# Patient Record
Sex: FEMALE | Race: BLACK OR AFRICAN AMERICAN | URBAN - METROPOLITAN AREA
[De-identification: names, ages, dates, MRNs, and addresses within clinical notes are randomized per-mention and may not be internally consistent; named-entity substitution may affect disease eponyms.]

---

## 2016-12-21 LAB — HBA1C MFR BLD HPLC: 7.7 %

## 2016-12-30 LAB — CREATININE, EXTERNAL: 1.74

## 2017-03-21 ENCOUNTER — OFFICE VISIT (OUTPATIENT)
Dept: ENDOCRINOLOGY | Age: 62
End: 2017-03-21

## 2017-03-21 VITALS
HEART RATE: 54 BPM | DIASTOLIC BLOOD PRESSURE: 53 MMHG | WEIGHT: 226.5 LBS | SYSTOLIC BLOOD PRESSURE: 137 MMHG | TEMPERATURE: 97.1 F | RESPIRATION RATE: 18 BRPM | BODY MASS INDEX: 36.4 KG/M2 | HEIGHT: 66 IN

## 2017-03-21 DIAGNOSIS — Z79.4 UNCONTROLLED TYPE 2 DIABETES MELLITUS WITH HYPERGLYCEMIA, WITH LONG-TERM CURRENT USE OF INSULIN (HCC): ICD-10-CM

## 2017-03-21 DIAGNOSIS — E05.90 HYPERTHYROIDISM: ICD-10-CM

## 2017-03-21 DIAGNOSIS — Z79.4 TYPE 2 DIABETES MELLITUS WITH HYPERGLYCEMIA, WITH LONG-TERM CURRENT USE OF INSULIN (HCC): ICD-10-CM

## 2017-03-21 DIAGNOSIS — E04.0 GOITER DIFFUSE: ICD-10-CM

## 2017-03-21 DIAGNOSIS — E11.65 TYPE 2 DIABETES MELLITUS WITH HYPERGLYCEMIA, WITH LONG-TERM CURRENT USE OF INSULIN (HCC): ICD-10-CM

## 2017-03-21 DIAGNOSIS — E05.90 HYPERTHYROIDISM: Primary | ICD-10-CM

## 2017-03-21 DIAGNOSIS — E11.65 UNCONTROLLED TYPE 2 DIABETES MELLITUS WITH HYPERGLYCEMIA, WITH LONG-TERM CURRENT USE OF INSULIN (HCC): ICD-10-CM

## 2017-03-21 RX ORDER — INSULIN LISPRO 100 [IU]/ML
INJECTION, SOLUTION INTRAVENOUS; SUBCUTANEOUS
Qty: 20 ML | Refills: 5 | Status: SHIPPED | OUTPATIENT
Start: 2017-03-21 | End: 2018-04-27 | Stop reason: SDUPTHER

## 2017-03-21 RX ORDER — METHIMAZOLE 10 MG/1
10 TABLET ORAL DAILY
Qty: 30 TAB | Refills: 5 | Status: SHIPPED | OUTPATIENT
Start: 2017-03-21 | End: 2017-06-23 | Stop reason: SDUPTHER

## 2017-03-21 NOTE — PROGRESS NOTES
[facility]               Luis Fernando Guzmán MD        [address] 78 [phone] Fax [phone]           Patient Information  Date:3/23/2017  Name : Nicole Gayle 62 y.o.     YOB: 1955         Referred by: Milena Baron MD         Chief Complaint   Patient presents with    Diabetes    Thyroid Problem       History of present illness    Nicole Gayle is a 62 y.o. female  here for fu  of thyroid. In July 2015, she was found to have suppressed TSH with elevated free T4. She has a history of HIV/AIDS and is on antiretroviral therapy. She is followed by an infectious disease . She has no history of leukopenia. She was seen 6 months ago and had labs at ID clinic  She did not follow up with PCP either , she had told me that DM was managed by PCP in the past     she did not bring the meter or log. I do not have data to adjust the medications. CPAP was taken from her as she was not using it        Wt Readings from Last 3 Encounters:   03/21/17 226 lb 8 oz (102.7 kg)   09/19/16 232 lb (105.2 kg)   08/22/16 234 lb (106.1 kg)          Past Medical History:   Diagnosis Date    [facility] [address]     HIV (human immunodeficiency virus infection) ([facility] [address]     Hyperlipidemia     Hypertension     Sleep apnea        Current Outpatient Prescriptions   Medication Sig    gabapentin (NEURONTIN) 100 mg capsule Take 1 Cap by mouth two (2) times a day.  atenolol (TENORMIN) 100 mg tablet Take 1 Tab by mouth daily. Stop Propranolol    pravastatin (PRAVACHOL) 40 mg tablet Take 40 mg by mouth nightly.  sodium bicarbonate 325 mg tablet Take 325 mg by mouth two (2) times a day.  zidovudine (RETROVIR) 300 mg tablet Take 300 mg by mouth two (2) times a day.  aspirin delayed-release 81 mg tablet Take 81 mg by mouth daily.  furosemide (LASIX) 20 mg tablet Take 40 mg by mouth daily.     amLODIPine-benazepril (LOTREL) 10-20 mg per capsule Take 1 Cap by mouth daily.  FERROUS SULFATE PO Take 325 Tabs by mouth two (2) times a day.  atazanavir (REYATAZ) 300 mg capsule Take 300 mg by mouth Daily (before breakfast).  lamiVUDine (EPIVIR) 300 mg tablet Take 300 mg by mouth daily.  ritonavir (NORVIR) 100 mg tablet Take 100 mg by mouth two (2) times daily (with meals).  methIMAzole (TAPAZOLE) 10 mg tablet Take 1 Tab by mouth daily.  insulin NPH (NOVOLIN N) 100 unit/mL injection Inject 30 units in the AM and 25 units in the PM (Stop Novolog 70/30)    insulin lispro (HUMALOG) 100 unit/mL injection Use with SSI Max units daily: 45    Insulin Syringe-Needle U-100 (ULTICARE) 0.5 mL 31 gauge x 5/16 syrg Use to inject insulin twice daily Dx Code: E11.65    glucose blood VI test strips (ASCENSIA CONTOUR) strip Test blood glucose 3 times daily Dx Code: E11.65     No current facility-administered medications for this visit. Review of Systems:  - Constitutional Symptoms: no fevers, chills,   - Gastrointestinal: no dysphagia or abdominal pain  - Musculoskeletal: + joint pains + weakness  - Integumentary: no rashes  - Neurological: no numbness, tingling, or headaches  - Psychiatric: no depression or anxiety  - Endocrine: no heat intolerance, no polyuria or polydipsia    Physical Examination:  - Blood pressure 137/53, pulse (!) 54, temperature 97.1 °F (36.2 °C), temperature source Oral, resp. rate 18, height 5' 6\" (1.676 m), weight 226 lb 8 oz (102.7 kg). Body mass index is 36.56 kg/(m^2).   - General: pleasant, no distress, good eye contact  - HEENT: no exopthalmos, no periorbital edema, no scleral/conjunctival injection, EOMI, no lid lag or stare  - Neck: supple, + thyromegaly,no  nodules, lymph nodes,   - Cardiovascular: regular,  normal S1 and S2, no murmurs  - Respiratory: clear to auscultation bilaterally  - Gastrointestinal: soft, nontender, nondistended, BS +  - LE - left ankle swollen , tender and no warmth  - Neurological: alert and oriented   - Psychiatric: normal mood and affect  - Skin - normal turgor    Data Reviewed:       Lab Results   Component Value Date/Time    TSH <0.006 09/19/2016 10:54 AM    T4, Free 1.58 09/19/2016 10:54 AM        [] Reviewed labs    Assessment/Plan:     1. Hyperthyroidism    2. Type 2 diabetes mellitus with hyperglycemia, with long-term current use of insulin (HCC)    3. Goiter diffuse      1. Graves disease /Goiter  Need labs    MMI 10 mg  WBC count is normal.    Declined  radioactive iodine therapy. 2.  HIV/AIDS:  Follow up with infectious disease . We have to follow liver function as well as CBC while on Methimazole given comorbidity. 3 HTN - she is taking propranolol once daily which will not help her  TO improve compliance changed to Atenolol     4 DM type 2 -she had told me DM was managed by PCP but told Dr Glory Saenz that I am managing DM   Will forward the note  NPH 30 units in AM and 25 units         Follow-up Disposition:  Return in about 3 months (around 6/21/2017). Thank you for allowing me to participate in the care of this patient.     Meet Bell MD

## 2017-03-21 NOTE — PATIENT INSTRUCTIONS
NPH 30 units in AM and 25 units at bedtime    Humalog fast acting for sugars as per the scale below     Blood sugar  Breakfast/Lunch/Dinner         150-200  Add  3  Units       201-250  Add  6 Units       251-300  Add  9 Units       301-350  Add 12  Units        351-400  Add 15  Units

## 2017-03-21 NOTE — PROGRESS NOTES
Patel Shankar is a 62 y.o. female here for   Chief Complaint   Patient presents with    Diabetes    Thyroid Problem       Functional glucose monitor and record keeping system? - yes  Eye exam within last year? - has appt scheduled  Foot exam within last year? - yes PCP yesterday    Lab Results   Component Value Date/Time    Hemoglobin A1c 6.2 09/19/2016 10:54 AM    Hemoglobin A1c, External 8.7 07/29/2015 03:14 PM       Wt Readings from Last 3 Encounters:   09/19/16 232 lb (105.2 kg)   08/22/16 234 lb (106.1 kg)   03/17/16 225 lb (102.1 kg)     Temp Readings from Last 3 Encounters:   09/19/16 97 °F (36.1 °C)   08/22/16 98.2 °F (36.8 °C) (Oral)   03/17/16 96 °F (35.6 °C) (Oral)     BP Readings from Last 3 Encounters:   09/19/16 153/66   08/22/16 135/63   03/17/16 181/73     Pulse Readings from Last 3 Encounters:   09/19/16 67   08/22/16 66   03/17/16 93

## 2017-03-21 NOTE — MR AVS SNAPSHOT
Visit Information Date & Time Provider Department Dept. Phone Encounter #  
 3/21/2017  1:15 PM Magali Vo MD [facility] [phone] [id number] Follow-up Instructions Return in about 3 months (around 6/21/2017). Upcoming Health Maintenance Date Due Hepatitis C Screening 1955 FOOT EXAM Q1 8/20/1966 EYE EXAM RETINAL OR DILATED Q1 8/20/1966 Pneumococcal 19-64 Highest Risk (1 of 3 - PCV13) 8/20/1975 DTaP/Tdap/Td series (1 - Tdap) 8/20/1977 PAP AKA CERVICAL CYTOLOGY 8/20/1977 BREAST CANCER SCRN MAMMOGRAM 8/20/2006 FOBT Q 1 YEAR AGE 50-75 8/20/2006 MICROALBUMIN Q1 7/29/2016 INFLUENZA AGE 9 TO ADULT 8/1/2016 ZOSTER VACCINE AGE 60> 8/20/2016 HEMOGLOBIN A1C Q6M 3/19/2017 LIPID PANEL Q1 9/19/2017 Allergies as of 3/21/2017  Review Complete On: 3/21/2017 By: Magali Vo MD  
  
 Severity Noted Reaction Type Reactions Pcn [Penicillins]  03/17/2016    Unknown (comments) Happened as a child Current Immunizations  Never Reviewed No immunizations on file. Not reviewed this visit You Were Diagnosed With   
  
 Codes Comments Type 2 diabetes mellitus with hyperglycemia, with long-term current use of insulin (HCC)    -  Primary ICD-10-CM: E11.65, Z79.4 ICD-9-CM: 250.00, 790.29, V58.67 Goiter diffuse     ICD-10-CM: E04.9 ICD-9-CM: 240.9 Hyperthyroidism     ICD-10-CM: E05.90 ICD-9-CM: 242.90 Vitals BP Pulse Temp Resp Height(growth percentile) Weight(growth percentile) 137/53 (BP 1 Location: Left arm, BP Patient Position: Sitting) (!) 54 97.1 °F (36.2 °C) (Oral) 18 5' 6\" (1.676 m) 226 lb 8 oz (102.7 kg) BMI OB Status Smoking Status 36.56 kg/m2 Unknown Current Every Day Smoker BMI and BSA Data Body Mass Index Body Surface Area  
 36.56 kg/m 2 2.19 m 2 Preferred Pharmacy Pharmacy Name Phone  [address] - [address] [phone] Your Updated Medication List  
  
   
This list is accurate as of: 3/21/17  1:51 PM.  Always use your most recent med list. amLODIPine-benazepril 10-20 mg per capsule Commonly known as:  Samantha Hausen Take 1 Cap by mouth daily. aspirin delayed-release 81 mg tablet Take 81 mg by mouth daily. atenolol 100 mg tablet Commonly known as:  TENORMIN Take 1 Tab by mouth daily. Stop Propranolol FERROUS SULFATE PO Take 325 Tabs by mouth two (2) times a day. furosemide 20 mg tablet Commonly known as:  LASIX Take 40 mg by mouth daily. gabapentin 100 mg capsule Commonly known as:  NEURONTIN Take 1 Cap by mouth two (2) times a day. glucose blood VI test strips strip Commonly known as:  Ascensia CONTOUR Test blood glucose 3 times daily Dx Code: E11.65  
  
 insulin  unit/mL injection Commonly known as:  NovoLIN N Inject 30 units in the AM and 25 units in the PM (Stop Novolog 70/30) Insulin Syringe-Needle U-100 0.5 mL 31 gauge x 5/16 Syrg Commonly known as:  Mariah Luu Use to inject insulin twice daily Dx Code: E11.65  
  
 lamiVUDine 300 mg tablet Commonly known as:  Stuckey Crews Take 300 mg by mouth daily. methIMAzole 10 mg tablet Commonly known as:  TAPAZOLE Take 1 Tab by mouth daily. NORVIR 100 mg tablet Generic drug:  ritonavir Take 100 mg by mouth two (2) times daily (with meals). pravastatin 40 mg tablet Commonly known as:  PRAVACHOL Take 40 mg by mouth nightly. REYATAZ 300 mg capsule Generic drug:  atazanavir Take 300 mg by mouth Daily (before breakfast). sodium bicarbonate 325 mg tablet Take 325 mg by mouth two (2) times a day. zidovudine 300 mg tablet Commonly known as:  RETROVIR Take 300 mg by mouth two (2) times a day. Follow-up Instructions Return in about 3 months (around 6/21/2017). To-Do List   
 03/21/2017   Lab: HEMOGLOBIN A1C WITH EAG   
  
 03/21/2017 Lab:  LDL, DIRECT   
  
 03/21/2017 Lab:  METABOLIC PANEL, COMPREHENSIVE   
  
 03/21/2017 Lab:  MICROALBUMIN, UR, RAND W/ MICROALBUMIN/CREA RATIO   
  
 03/21/2017 Lab:  T4, FREE   
  
 03/21/2017 Lab:  TSH 3RD GENERATION   
  
 06/01/2017 Lab:  HEMOGLOBIN A1C WITH EAG   
  
 06/01/2017 Lab:  LDL, DIRECT   
  
 06/01/2017 Lab:  METABOLIC PANEL, COMPREHENSIVE   
  
 06/01/2017 Lab:  MICROALBUMIN, UR, RAND W/ MICROALBUMIN/CREA RATIO   
  
 06/01/2017 Lab:  T4, FREE   
  
 06/01/2017 Lab:  TSH 3RD GENERATION Patient Instructions NPH 30 units in AM and 25 units at bedtime Humalog fast acting for sugars as per the scale below Blood sugar  Breakfast/Lunch/Dinner 150-200  Add  3  Units 201-250  Add  6 Units 251-300  Add  9 Units 301-350  Add 12  Units 351-400  Add 15  Units Introducing [facility]! Virgil Ramirez introduces [employer] patient portal. Now you can access parts of your medical record, email your doctor's office, and request medication refills online. 1. In your internet browser, go to [email or web address]. [email or web address] 2. Click on the First Time User? Click Here link in the Sign In box. You will see the New Member Sign Up page. 3. Enter your [employer] Access Code exactly as it appears below. You will not need to use this code after youve completed the sign-up process. If you do not sign up before the expiration date, you must request a new code. · [employer] Access Code: [email or web address] Expires: 6/19/2017  1:51 PM 
 
4. Enter the last four digits of your Social Security Number (xxxx) and Date of Birth (mm/dd/yyyy) as indicated and click Submit. You will be taken to the next sign-up page. 5. Create a [employer] ID. This will be your [employer] login ID and cannot be changed, so think of one that is secure and easy to remember. 6. Create a [employer] password. You can change your password at any time. 7. Enter your Password Reset Question and Answer. This can be used at a later time if you forget your password. 8. Enter your e-mail address. You will receive e-mail notification when new information is available in [address] 9. Click Sign Up. You can now view and download portions of your medical record. 10. Click the Download Summary menu link to download a portable copy of your medical information. If you have questions, please visit the Frequently Asked Questions section of the [employer] website. Remember, [employer] is NOT to be used for urgent needs. For medical emergencies, dial 911. Now available from your iPhone and Android! Please provide this summary of care documentation to your next provider. Your primary care clinician is listed as Mckinley Weeks If you have any questions after today's visit, please call [phone].

## 2017-04-08 LAB
ALBUMIN SERPL-MCNC: 4.1 G/DL (ref 3.6–4.8)
ALBUMIN/CREAT UR: 1211.5 MG/G CREAT (ref 0–30)
ALBUMIN/GLOB SERPL: 1.3 {RATIO} (ref 1.2–2.2)
ALP SERPL-CCNC: 204 IU/L (ref 39–117)
ALT SERPL-CCNC: 52 IU/L (ref 0–32)
AST SERPL-CCNC: 39 IU/L (ref 0–40)
BILIRUB SERPL-MCNC: 4.8 MG/DL (ref 0–1.2)
BUN SERPL-MCNC: 31 MG/DL (ref 8–27)
BUN/CREAT SERPL: 25 (ref 12–28)
CALCIUM SERPL-MCNC: 9.1 MG/DL (ref 8.7–10.3)
CHLORIDE SERPL-SCNC: 106 MMOL/L (ref 96–106)
CO2 SERPL-SCNC: 22 MMOL/L (ref 18–29)
CREAT SERPL-MCNC: 1.26 MG/DL (ref 0.57–1)
CREAT UR-MCNC: 48.7 MG/DL
EST. AVERAGE GLUCOSE BLD GHB EST-MCNC: 163 MG/DL
GLOBULIN SER CALC-MCNC: 3.1 G/DL (ref 1.5–4.5)
GLUCOSE SERPL-MCNC: 167 MG/DL (ref 65–99)
HBA1C MFR BLD: 7.3 % (ref 4.8–5.6)
INTERPRETATION: NORMAL
LDLC SERPL DIRECT ASSAY-MCNC: 84 MG/DL (ref 0–99)
MICROALBUMIN UR-MCNC: 590 UG/ML
POTASSIUM SERPL-SCNC: 4.8 MMOL/L (ref 3.5–5.2)
PROT SERPL-MCNC: 7.2 G/DL (ref 6–8.5)
SODIUM SERPL-SCNC: 145 MMOL/L (ref 134–144)
T4 FREE SERPL-MCNC: 1.84 NG/DL (ref 0.82–1.77)
TSH SERPL DL<=0.005 MIU/L-ACNC: <0.006 UIU/ML (ref 0.45–4.5)

## 2017-04-10 ENCOUNTER — TELEPHONE (OUTPATIENT)
Dept: ENDOCRINOLOGY | Age: 62
End: 2017-04-10

## 2017-04-11 LAB
BASOPHILS # BLD AUTO: 0 X10E3/UL (ref 0–0.2)
BASOPHILS NFR BLD AUTO: 0 %
EOSINOPHIL # BLD AUTO: 0.2 X10E3/UL (ref 0–0.4)
EOSINOPHIL NFR BLD AUTO: 3 %
ERYTHROCYTE [DISTWIDTH] IN BLOOD BY AUTOMATED COUNT: 16.5 % (ref 12.3–15.4)
HCT VFR BLD AUTO: 33.4 % (ref 34–46.6)
HGB BLD-MCNC: 10.3 G/DL (ref 11.1–15.9)
IMM GRANULOCYTES # BLD: 0 X10E3/UL (ref 0–0.1)
IMM GRANULOCYTES NFR BLD: 0 %
LYMPHOCYTES # BLD AUTO: 1.8 X10E3/UL (ref 0.7–3.1)
LYMPHOCYTES NFR BLD AUTO: 26 %
MCH RBC QN AUTO: 27.8 PG (ref 26.6–33)
MCHC RBC AUTO-ENTMCNC: 30.8 G/DL (ref 31.5–35.7)
MCV RBC AUTO: 90 FL (ref 79–97)
MONOCYTES # BLD AUTO: 0.6 X10E3/UL (ref 0.1–0.9)
MONOCYTES NFR BLD AUTO: 9 %
NEUTROPHILS # BLD AUTO: 4.3 X10E3/UL (ref 1.4–7)
NEUTROPHILS NFR BLD AUTO: 62 %
PLATELET # BLD AUTO: 295 X10E3/UL (ref 150–379)
RBC # BLD AUTO: 3.7 X10E6/UL (ref 3.77–5.28)
SPECIMEN STATUS REPORT, ROLRST: NORMAL
WBC # BLD AUTO: 6.9 X10E3/UL (ref 3.4–10.8)

## 2017-06-23 ENCOUNTER — OFFICE VISIT (OUTPATIENT)
Dept: ENDOCRINOLOGY | Age: 62
End: 2017-06-23

## 2017-06-23 VITALS
OXYGEN SATURATION: 97 % | HEART RATE: 58 BPM | BODY MASS INDEX: 36.48 KG/M2 | RESPIRATION RATE: 20 BRPM | HEIGHT: 66 IN | TEMPERATURE: 97.4 F | SYSTOLIC BLOOD PRESSURE: 122 MMHG | WEIGHT: 227 LBS | DIASTOLIC BLOOD PRESSURE: 48 MMHG

## 2017-06-23 DIAGNOSIS — E11.65 UNCONTROLLED TYPE 2 DIABETES MELLITUS WITH HYPERGLYCEMIA, UNSPECIFIED LONG TERM INSULIN USE STATUS: ICD-10-CM

## 2017-06-23 DIAGNOSIS — E05.90 HYPERTHYROIDISM: ICD-10-CM

## 2017-06-23 DIAGNOSIS — E04.0 GOITER DIFFUSE: ICD-10-CM

## 2017-06-23 DIAGNOSIS — Z79.4 UNCONTROLLED TYPE 2 DIABETES MELLITUS WITH HYPERGLYCEMIA, WITH LONG-TERM CURRENT USE OF INSULIN (HCC): ICD-10-CM

## 2017-06-23 DIAGNOSIS — E11.65 UNCONTROLLED TYPE 2 DIABETES MELLITUS WITH HYPERGLYCEMIA, WITH LONG-TERM CURRENT USE OF INSULIN (HCC): ICD-10-CM

## 2017-06-23 DIAGNOSIS — E05.90 HYPERTHYROIDISM: Primary | ICD-10-CM

## 2017-06-23 RX ORDER — METHIMAZOLE 10 MG/1
TABLET ORAL
Qty: 30 TAB | Refills: 5 | Status: SHIPPED | OUTPATIENT
Start: 2017-06-23 | End: 2017-12-29 | Stop reason: SDUPTHER

## 2017-06-23 RX ORDER — GABAPENTIN 600 MG/1
TABLET ORAL 3 TIMES DAILY
COMMUNITY
End: 2018-11-26

## 2017-06-23 RX ORDER — TRAZODONE HYDROCHLORIDE 50 MG/1
TABLET ORAL
COMMUNITY
End: 2018-11-26

## 2017-06-23 NOTE — PROGRESS NOTES
Chief Complaint   Patient presents with    Diabetes     f/u     Eye exam on June 29th    Foot exam was this year, unknown month    Wt Readings from Last 3 Encounters:   06/23/17 227 lb (103 kg)   03/21/17 226 lb 8 oz (102.7 kg)   09/19/16 232 lb (105.2 kg)     Temp Readings from Last 3 Encounters:   06/23/17 97.4 °F (36.3 °C) (Oral)   03/21/17 97.1 °F (36.2 °C) (Oral)   09/19/16 97 °F (36.1 °C)     BP Readings from Last 3 Encounters:   06/23/17 120/46   03/21/17 137/53   09/19/16 153/66     Pulse Readings from Last 3 Encounters:   06/23/17 (!) 58   03/21/17 (!) 54   09/19/16 67

## 2017-06-23 NOTE — PATIENT INSTRUCTIONS
NPH 30 units in AM and 20 units at bedtime    Humalog fast acting for sugars as per the scale below     Blood sugar  Breakfast/Lunch/Dinner         150-200  Add  3  Units       201-250  Add  6 Units       251-300  Add  9 Units       301-350  Add 12  Units        351-400  Add 15  Units            Radioactive Iodine: What to Expect at Home  Your Recovery  Radioactive iodine is absorbed and concentrated by the thyroid gland. You get it in liquid or pill form. The radiation will pass out of your body through your urine within days. Until that time, you will give off radiation in your sweat, your saliva, your urine, and anything else that comes out of your body. It is important to avoid exposing other people to the radioactivity from your body. Your doctor will give you more written instructions. Follow these carefully. The instructions will tell you how far to stay away from people and how long you need to follow the precautions listed below. They will list other ways to keep other people safe. They will also tell you when it will be safe to go out, go to work, and do other activities. How can you care for yourself at home? General recommendations  · For a period of time, you will need to keep your distance from other people, especially young children and pregnant women. · Avoid close contact, kissing, and sexual activity. You may need to sleep in a separate bed from your partner. · Keep the toilet very clean. Men should urinate sitting down to avoid splashing. Flush the toilet 2 or 3 times after each use. Wash your hands well with soap and lots of water each time you use the toilet. · Rinse the bathroom sink and tub well after you use them. · Use separate towels, washcloths, and sheets. Wash these and your personal clothing by themselves. Don't wash them with other people's laundry.   · You may want to use a special plastic trash bag for all your trash, such as bandages, paper or plastic dishes, menstrual pads, tissues, or paper towels. Talk to your treatment facility to see if they will handle the disposal. Or after 80 days, this bag can be thrown out with your other trash. · Wash your dishes in a  or by hand. If you use disposable dishes, they must be thrown away in the special plastic trash bag. · Don't cook for other people. If you must cook, use plastic gloves. Then throw them away in the special plastic trash bag. Don't share cups, dishes, or utensils. Pregnancy and children  · Your doctor will tell you when it is safe to have sex and become pregnant. · You should not breastfeed your baby after you have been treated with radioactive iodine. Ask your doctor when it's safe to breastfeed. Travel  · Don't take public transportation. If you are able, it's best to drive yourself. · It is important to prepare for any problems you may have at airport security. People who have had radioactive iodine treatment can set off the radiation detection machines in airports for a week to 10 days. Check with local authorities about any steps or permission you may need to travel. · If you plan to travel on the interstate, you may set off radiation detectors. Most police and transportation workers are aware of medical radiation, but it may help to carry some paperwork from your doctor. Follow-up care is a key part of your treatment and safety. Be sure to make and go to all appointments, and call your doctor if you are having problems. It's also a good idea to know your test results and keep a list of the medicines you take. When should you call for help? Watch closely for any changes in your health, and be sure to contact your doctor if:  · You have a sore throat. · You vomit. · You have diarrhea. Where can you learn more? Go to http://bridget-agustín.info/. Enter A096 in the search box to learn more about \"Radioactive Iodine: What to Expect at Home. \"  Current as of: July 28, 2016  Content Version: 11.3  © 8774-5690 [employer]. Care instructions adapted under license by [employer] (which disclaims liability or warranty for this information). If you have questions about a medical condition or this instruction, always ask your healthcare professional. [address] any warranty or liability for your use of this information.

## 2017-06-23 NOTE — MR AVS SNAPSHOT
Visit Information Date & Time Provider Department Dept. Phone Encounter #  
 6/23/2017  2:30 PM Alexander Lewis MD [facility] [phone] [id number] Follow-up Instructions Return in about 4 months (around 10/23/2017). Upcoming Health Maintenance Date Due Hepatitis C Screening 1955 FOOT EXAM Q1 8/20/1966 EYE EXAM RETINAL OR DILATED Q1 8/20/1966 Pneumococcal 19-64 Highest Risk (1 of 3 - PCV13) 8/20/1975 DTaP/Tdap/Td series (1 - Tdap) 8/20/1977 PAP AKA CERVICAL CYTOLOGY 8/20/1977 BREAST CANCER SCRN MAMMOGRAM 8/20/2006 FOBT Q 1 YEAR AGE 50-75 8/20/2006 ZOSTER VACCINE AGE 60> 8/20/2016 INFLUENZA AGE 9 TO ADULT 8/1/2017 HEMOGLOBIN A1C Q6M 12/16/2017 LIPID PANEL Q1 3/16/2018 MICROALBUMIN Q1 6/16/2018 Allergies as of 6/23/2017  Review Complete On: 6/23/2017 By: Alexander Lewis MD  
  
 Severity Noted Reaction Type Reactions Pcn [Penicillins]  03/17/2016    Unknown (comments) Happened as a child Current Immunizations  Never Reviewed No immunizations on file. Not reviewed this visit You Were Diagnosed With   
  
 Codes Comments Goiter diffuse    -  Primary ICD-10-CM: E04.9 ICD-9-CM: 240.9 Hyperthyroidism     ICD-10-CM: E05.90 ICD-9-CM: 242.90 Vitals BP Pulse Temp Resp Height(growth percentile) Weight(growth percentile) 122/48 (BP 1 Location: Left arm, BP Patient Position: Sitting) (!) 58 97.4 °F (36.3 °C) (Oral) 20 5' 6\" (1.676 m) 227 lb (103 kg) SpO2 BMI OB Status Smoking Status 97% 36.64 kg/m2 Unknown Current Every Day Smoker Vitals History BMI and BSA Data Body Mass Index Body Surface Area  
 36.64 kg/m 2 2.19 m 2 Preferred Pharmacy Pharmacy Name Phone [address], [address] [place] [address] [address] [phone] Your Updated Medication List  
  
   
This list is accurate as of: 6/23/17  2:33 PM.  Always use your most recent med list. amLODIPine-benazepril 10-20 mg per capsule Commonly known as:  Edythe Lien Take 1 Cap by mouth daily. aspirin delayed-release 81 mg tablet Take 81 mg by mouth daily. atenolol 100 mg tablet Commonly known as:  TENORMIN Take 1 Tab by mouth daily. Stop Propranolol FERROUS SULFATE PO Take 325 Tabs by mouth two (2) times a day. furosemide 20 mg tablet Commonly known as:  LASIX Take 40 mg by mouth daily. * gabapentin 600 mg tablet Commonly known as:  NEURONTIN Take  by mouth three (3) times daily. * gabapentin 100 mg capsule Commonly known as:  NEURONTIN Take 1 Cap by mouth two (2) times a day. glucose blood VI test strips strip Commonly known as:  Ascensia CONTOUR Test blood glucose 3 times daily Dx Code: E11.65  
  
 insulin lispro 100 unit/mL injection Commonly known as:  HUMALOG Use with SSI Max units daily: 45 Insulin Syringe-Needle U-100 0.5 mL 31 gauge x 5/16 Syrg Commonly known as:  Areatha Starling Use to inject insulin twice daily Dx Code: E11.65  
  
 lamiVUDine 300 mg tablet Commonly known as:  Gemma Marking Take 300 mg by mouth daily. methIMAzole 10 mg tablet Commonly known as:  TAPAZOLE Take 1 Tab by mouth daily. NORVIR 100 mg tablet Generic drug:  ritonavir Take 100 mg by mouth two (2) times daily (with meals). NovoLIN N 100 unit/mL injection Generic drug:  insulin NPH INJECT 30 UNITS EVERY MORNING & 25 UNITS EVERY EVENING. (STOP ICFDEPL88/30) pravastatin 40 mg tablet Commonly known as:  PRAVACHOL Take 40 mg by mouth nightly. REYATAZ 300 mg capsule Generic drug:  atazanavir Take 300 mg by mouth Daily (before breakfast). sodium bicarbonate 325 mg tablet Take 325 mg by mouth two (2) times a day. traZODone 50 mg tablet Commonly known as:  Marguerite Villalpando Take  by mouth nightly. zidovudine 300 mg tablet Commonly known as:  RETROVIR Take 300 mg by mouth two (2) times a day. * Notice: This list has 2 medication(s) that are the same as other medications prescribed for you. Read the directions carefully, and ask your doctor or other care provider to review them with you. Follow-up Instructions Return in about 4 months (around 10/23/2017). Patient Instructions NPH 30 units in AM and 20 units at bedtime Humalog fast acting for sugars as per the scale below Blood sugar  Breakfast/Lunch/Dinner 150-200  Add  3  Units 201-250  Add  6 Units 251-300  Add  9 Units 301-350  Add 12  Units 351-400  Add 15  Units Radioactive Iodine: What to Expect at [facility] Your Recovery Radioactive iodine is absorbed and concentrated by the thyroid gland. You get it in liquid or pill form. The radiation will pass out of your body through your urine within days. Until that time, you will give off radiation in your sweat, your saliva, your urine, and anything else that comes out of your body. It is important to avoid exposing other people to the radioactivity from your body. Your doctor will give you more written instructions. Follow these carefully. The instructions will tell you how far to stay away from people and how long you need to follow the precautions listed below. They will list other ways to keep other people safe. They will also tell you when it will be safe to go out, go to work, and do other activities. How can you care for yourself at home? General recommendations · For a period of time, you will need to keep your distance from other people, especially young children and pregnant women. · Avoid close contact, kissing, and sexual activity. You may need to sleep in a separate bed from your partner. · Keep the toilet very clean. Men should urinate sitting down to avoid splashing. Flush the toilet 2 or 3 times after each use.  Wash your hands well with soap and lots of water each time you use the toilet. · Rinse the bathroom sink and tub well after you use them. · Use separate towels, washcloths, and sheets. Wash these and your personal clothing by themselves. Don't wash them with other people's laundry. · You may want to use a special plastic trash bag for all your trash, such as bandages, paper or plastic dishes, menstrual pads, tissues, or paper towels. Talk to your treatment facility to see if they will handle the disposal. Or after 80 days, this bag can be thrown out with your other trash. · Wash your dishes in a  or by hand. If you use disposable dishes, they must be thrown away in the special plastic trash bag. · Don't cook for other people. If you must cook, use plastic gloves. Then throw them away in the special plastic trash bag. Don't share cups, dishes, or utensils. Pregnancy and children · Your doctor will tell you when it is safe to have sex and become pregnant. · You should not breastfeed your baby after you have been treated with radioactive iodine. Ask your doctor when it's safe to breastfeed. Travel · Don't take public transportation. If you are able, it's best to drive yourself. · It is important to prepare for any problems you may have at airport security. People who have had radioactive iodine treatment can set off the radiation detection machines in airports for a week to 10 days. Check with local authorities about any steps or permission you may need to travel. · If you plan to travel on the interstate, you may set off radiation detectors. Most police and transportation workers are aware of medical radiation, but it may help to carry some paperwork from your doctor. Follow-up care is a key part of your treatment and safety. Be sure to make and go to all appointments, and call your doctor if you are having problems. It's also a good idea to know your test results and keep a list of the medicines you take. When should you call for help? Watch closely for any changes in your health, and be sure to contact your doctor if: 
· You have a sore throat. · You vomit. · You have diarrhea. Where can you learn more? Go to http://bridget-agustín.info/. Enter P099 in the search box to learn more about \"Radioactive Iodine: What to Expect at Home. \" Current as of: July 28, 2016 Content Version: 11.3 © 1041-2267 [employer]. Care instructions adapted under license by [employer] (which disclaims liability or warranty for this information). If you have questions about a medical condition or this instruction, always ask your healthcare professional. [address] any warranty or liability for your use of this information. Introducing [facility]! [employer] introduces Truli patient portal. Now you can access parts of your medical record, email your doctor's office, and request medication refills online. 1. In your internet browser, go to [email or web address] 2. Click on the First Time User? Click Here link in the Sign In box. You will see the New Member Sign Up page. 3. Enter your Truli Access Code exactly as it appears below. You will not need to use this code after youve completed the sign-up process. If you do not sign up before the expiration date, you must request a new code. · Truli Access Code: [email or web address] Expires: 9/21/2017  2:33 PM 
 
4. Enter the last four digits of your Social Security Number (xxxx) and Date of Birth (mm/dd/yyyy) as indicated and click Submit. You will be taken to the next sign-up page. 5. Create a ParinGeni[employer] ID. This will be your Truli login ID and cannot be changed, so think of one that is secure and easy to remember. 6. Create a [employer] password. You can change your password at any time. 7. Enter your Password Reset Question and Answer. This can be used at a later time if you forget your password.   
8. Enter your e-mail address. You will receive e-mail notification when new information is available in [address] 9. Click Sign Up. You can now view and download portions of your medical record. 10. Click the Download Summary menu link to download a portable copy of your medical information. If you have questions, please visit the Frequently Asked Questions section of the [employer] website. Remember, [employer] is NOT to be used for urgent needs. For medical emergencies, dial 911. Now available from your iPhone and Android! Please provide this summary of care documentation to your next provider. Your primary care clinician is listed as Jessica López. If you have any questions after today's visit, please call [phone].

## 2017-06-23 NOTE — PROGRESS NOTES
[facility]               Amirah Valdovinos MD        [address] 78 [phone] Fax [phone]           Patient Information  Date:6/23/2017  Name : Arun Hughes 62 y.o.     YOB: 1955         Referred by: Ernesto Sumner MD         Chief Complaint   Patient presents with    Diabetes     f/u       History of present illness    Arun Hughes is a 62 y.o. female  here for fu  of thyroid. In July 2015, she was found to have suppressed TSH with elevated free T4. She has a history of HIV/AIDS and is on antiretroviral therapy. She is followed by an infectious disease . She has no history of leukopenia. She was seen 6 months ago and had labs at ID clinic  She did not follow up with PCP either , she had told me that DM was managed by PCP in the past     she did not bring the meter or log. I do not have data to adjust the medications. CPAP was taken from her as she was not using it        Wt Readings from Last 3 Encounters:   06/23/17 227 lb (103 kg)   03/21/17 226 lb 8 oz (102.7 kg)   09/19/16 232 lb (105.2 kg)          Past Medical History:   Diagnosis Date    Diabetes [facility] [address]     HIV [facility]human immunodeficiency virus infection) [facility] [address] [facility]     Hyperlipidemia     Hypertension     Sleep apnea        Current Outpatient Prescriptions   Medication Sig    gabapentin (NEURONTIN) 600 mg tablet Take  by mouth three (3) times daily.  traZODone (DESYREL) 50 mg tablet Take  by mouth nightly.  NOVOLIN N 100 unit/mL injection INJECT 30 UNITS EVERY MORNING & 25 UNITS EVERY EVENING. (STOP WMMVIPK91/30)    insulin lispro (HUMALOG) 100 unit/mL injection Use with SSI Max units daily: 45    Insulin Syringe-Needle U-100 (ULTICARE) 0.5 mL 31 gauge x 5/16 syrg Use to inject insulin twice daily Dx Code: E11.65    atenolol (TENORMIN) 100 mg tablet Take 1 Tab by mouth daily.  Stop Propranolol    glucose blood VI test strips (ASCENSIA CONTOUR) strip Test blood glucose 3 times daily Dx Code: E11.65    pravastatin (PRAVACHOL) 40 mg tablet Take 40 mg by mouth nightly.  sodium bicarbonate 325 mg tablet Take 325 mg by mouth two (2) times a day.  zidovudine (RETROVIR) 300 mg tablet Take 300 mg by mouth two (2) times a day.  aspirin delayed-release 81 mg tablet Take 81 mg by mouth daily.  furosemide (LASIX) 20 mg tablet Take 40 mg by mouth daily.  amLODIPine-benazepril (LOTREL) 10-20 mg per capsule Take 1 Cap by mouth daily.  FERROUS SULFATE PO Take 325 Tabs by mouth two (2) times a day.  atazanavir (REYATAZ) 300 mg capsule Take 300 mg by mouth Daily (before breakfast).  lamiVUDine (EPIVIR) 300 mg tablet Take 300 mg by mouth daily.  ritonavir (NORVIR) 100 mg tablet Take 100 mg by mouth two (2) times daily (with meals).  methIMAzole (TAPAZOLE) 10 mg tablet Take 10 mg alternating with 5 mg    gabapentin (NEURONTIN) 100 mg capsule Take 1 Cap by mouth two (2) times a day. No current facility-administered medications for this visit. Review of Systems:  - Constitutional Symptoms: no fevers, chills,   - Gastrointestinal: no dysphagia or abdominal pain  - Musculoskeletal: + joint pains + weakness  - Integumentary: no rashes  - Neurological: no numbness, tingling, or headaches  - Psychiatric: no depression or anxiety  - Endocrine: no heat intolerance, no polyuria or polydipsia    Physical Examination:  - Blood pressure 122/48, pulse (!) 58, temperature 97.4 °F (36.3 °C), temperature source Oral, resp. rate 20, height 5' 6\" (1.676 m), weight 227 lb (103 kg), SpO2 97 %. Body mass index is 36.64 kg/(m^2).   - General: pleasant, no distress, good eye contact  - HEENT: no exopthalmos, no periorbital edema, no scleral/conjunctival injection, EOMI, no lid lag or stare  - Neck: supple, + thyromegaly,no  nodules, lymph nodes,   - Cardiovascular: regular,  normal S1 and S2, no murmurs  - Respiratory: clear to auscultation bilaterally  - Gastrointestinal: soft, nontender, nondistended, BS +  - LE - left ankle swollen , tender and no warmth  - Neurological: alert and oriented   - Psychiatric: normal mood and affect  - Skin - normal turgor    Data Reviewed:       Lab Results   Component Value Date/Time    TSH 0.006 06/16/2017 10:42 AM    T4, Free 1.28 06/16/2017 10:42 AM        [] Reviewed labs    Assessment/Plan:     1. Hyperthyroidism    2. Goiter diffuse    3. Uncontrolled type 2 diabetes mellitus with hyperglycemia, unspecified long term insulin use status      1. Graves disease /Goiter     MMI 10 mg alternate with 5 mg  WBC count is normal.    Declined  radioactive iodine therapy. 2.  HIV/AIDS:  Follow up with infectious disease . We have to follow liver function as well as CBC while on Methimazole given comorbidity. 3 HTN - decrease Atenolol     4 DM type 2 -  Lab Results   Component Value Date/Time    Hemoglobin A1c 6.3 06/16/2017 10:42 AM    Hemoglobin A1c, External 7.7 12/21/2016     NPH 30 units in AM and 20 units         Follow-up Disposition:  Return in about 4 months (around 10/23/2017). Thank you for allowing me to participate in the care of this patient.     León Meza MD

## 2017-10-23 ENCOUNTER — OFFICE VISIT (OUTPATIENT)
Dept: ENDOCRINOLOGY | Age: 62
End: 2017-10-23

## 2017-10-23 VITALS
HEART RATE: 75 BPM | DIASTOLIC BLOOD PRESSURE: 58 MMHG | SYSTOLIC BLOOD PRESSURE: 134 MMHG | OXYGEN SATURATION: 96 % | WEIGHT: 220.8 LBS | RESPIRATION RATE: 16 BRPM | BODY MASS INDEX: 35.48 KG/M2 | TEMPERATURE: 97.6 F | HEIGHT: 66 IN

## 2017-10-23 DIAGNOSIS — Z79.4 TYPE 2 DIABETES MELLITUS WITH HYPERGLYCEMIA, WITH LONG-TERM CURRENT USE OF INSULIN (HCC): ICD-10-CM

## 2017-10-23 DIAGNOSIS — E05.90 HYPERTHYROIDISM: ICD-10-CM

## 2017-10-23 DIAGNOSIS — E04.0 GOITER DIFFUSE: ICD-10-CM

## 2017-10-23 DIAGNOSIS — E11.65 TYPE 2 DIABETES MELLITUS WITH HYPERGLYCEMIA, WITH LONG-TERM CURRENT USE OF INSULIN (HCC): ICD-10-CM

## 2017-10-23 DIAGNOSIS — E05.00 GRAVES DISEASE: Primary | ICD-10-CM

## 2017-10-23 RX ORDER — ERGOCALCIFEROL 1.25 MG/1
50000 CAPSULE ORAL
COMMUNITY

## 2017-10-23 RX ORDER — METOPROLOL SUCCINATE 25 MG/1
TABLET, EXTENDED RELEASE ORAL DAILY
COMMUNITY

## 2017-10-23 RX ORDER — HYDRALAZINE HYDROCHLORIDE 25 MG/1
25 TABLET, FILM COATED ORAL 3 TIMES DAILY
COMMUNITY

## 2017-10-23 NOTE — PROGRESS NOTES
Shwetha Batres is a 65 y.o. female here for   Chief Complaint   Patient presents with    Diabetes    Thyroid Problem       Functional glucose monitor and record keeping system? - yes  Eye exam within last year? - June 2017  Foot exam within last year? - beginning of 2017    1. Have you been to the ER, urgent care clinic since your last visit? Hospitalized since your last visit? -[address] [place] [address] for heart failure on 9/23/17    2. Have you seen or consulted any other health care providers outside of the [address] [place] [address] since your last visit? Include any pap smears or colon screening. -PCP      Lab Results   Component Value Date/Time    Hemoglobin A1c 7.1 10/16/2017 09:42 AM    Hemoglobin A1c, External 7.7 12/21/2016       Wt Readings from Last 3 Encounters:   06/23/17 227 lb (103 kg)   03/21/17 226 lb 8 oz (102.7 kg)   09/19/16 232 lb (105.2 kg)     Temp Readings from Last 3 Encounters:   06/23/17 97.4 °F (36.3 °C) (Oral)   03/21/17 97.1 °F (36.2 °C) (Oral)   09/19/16 97 °F (36.1 °C)     BP Readings from Last 3 Encounters:   06/23/17 122/48   03/21/17 137/53   09/19/16 153/66     Pulse Readings from Last 3 Encounters:   06/23/17 (!) 58   03/21/17 (!) 54   09/19/16 67

## 2017-10-23 NOTE — PATIENT INSTRUCTIONS
We will plan for iodine treatment  On Nov 17 th , stop Methimazole on Nov 12 th     NPH 25 units in AM and 20 units at bedtime    Humalog fast acting for sugars as per the scale below     Blood sugar  Breakfast/Lunch/Dinner         150-200  Add  3  Units       201-250  Add  6 Units       251-300  Add  9 Units       301-350  Add 12  Units        351-400  Add 15  Units            Radioactive Iodine: What to Expect at Home  Your Recovery  Radioactive iodine is absorbed and concentrated by the thyroid gland. You get it in liquid or pill form. The radiation will pass out of your body through your urine within days. Until that time, you will give off radiation in your sweat, your saliva, your urine, and anything else that comes out of your body. It is important to avoid exposing other people to the radioactivity from your body. Your doctor will give you more written instructions. Follow these carefully. The instructions will tell you how far to stay away from people and how long you need to follow the precautions listed below. They will list other ways to keep other people safe. They will also tell you when it will be safe to go out, go to work, and do other activities. How can you care for yourself at home? General recommendations  · For a period of time, you will need to keep your distance from other people, especially young children and pregnant women. · Avoid close contact, kissing, and sexual activity. You may need to sleep in a separate bed from your partner. · Keep the toilet very clean. Men should urinate sitting down to avoid splashing. Flush the toilet 2 or 3 times after each use. Wash your hands well with soap and lots of water each time you use the toilet. · Rinse the bathroom sink and tub well after you use them. · Use separate towels, washcloths, and sheets. Wash these and your personal clothing by themselves. Don't wash them with other people's laundry.   · You may want to use a special plastic trash bag for all your trash, such as bandages, paper or plastic dishes, menstrual pads, tissues, or paper towels. Talk to your treatment facility to see if they will handle the disposal. Or after 80 days, this bag can be thrown out with your other trash. · Wash your dishes in a  or by hand. If you use disposable dishes, they must be thrown away in the special plastic trash bag. · Don't cook for other people. If you must cook, use plastic gloves. Then throw them away in the special plastic trash bag. Don't share cups, dishes, or utensils. Pregnancy and children  · Your doctor will tell you when it is safe to have sex and become pregnant. · You should not breastfeed your baby after you have been treated with radioactive iodine. Ask your doctor when it's safe to breastfeed. Travel  · Don't take public transportation. If you are able, it's best to drive yourself. · It is important to prepare for any problems you may have at airport security. People who have had radioactive iodine treatment can set off the radiation detection machines in airports for a week to 10 days. Check with local authorities about any steps or permission you may need to travel. · If you plan to travel on the interstate, you may set off radiation detectors. Most police and transportation workers are aware of medical radiation, but it may help to carry some paperwork from your doctor. Follow-up care is a key part of your treatment and safety. Be sure to make and go to all appointments, and call your doctor if you are having problems. It's also a good idea to know your test results and keep a list of the medicines you take. When should you call for help? Watch closely for any changes in your health, and be sure to contact your doctor if:  · You have a sore throat. · You vomit. · You have diarrhea. Where can you learn more? Go to http://bridget-agustín.info/.   Enter C747 in the search box to learn more about \"Radioactive Iodine: What to Expect at Home. \"  Current as of: July 28, 2016  Content Version: 11.3  © 8649-5476 [employer]. Care instructions adapted under license by [employer] (which disclaims liability or warranty for this information). If you have questions about a medical condition or this instruction, always ask your healthcare professional. [address] any warranty or liability for your use of this information.

## 2017-10-23 NOTE — PROGRESS NOTES
[facility]               Nika Garza MD        [address] 78 [phone] Fax [phone]           Patient Information  Date:10/23/2017  Name : Robbin Noble 65 y.o.     YOB: 1955         Referred by: Bessie Baltazar MD         Chief Complaint   Patient presents with    Diabetes    Thyroid Problem       History of present illness    Robbin Noble is a 65 y.o. female  here for fu  of thyroid. In July 2015, she was found to have suppressed TSH with elevated free T4. She has a history of HIV/AIDS and is on antiretroviral therapy. She is followed by an infectious disease . She has no history of leukopenia. Was dx with CHF - diet has changed and has low BG    has log   Has anemia   Recent hospitalization           CPAP was taken from her as she was not using it        Wt Readings from Last 3 Encounters:   10/23/17 220 lb 12.8 oz (100.2 kg)   06/23/17 227 lb (103 kg)   03/21/17 226 lb 8 oz (102.7 kg)          Past Medical History:   Diagnosis Date    [facility]     HIV (human immunodeficiency virus infection) ([facility]     Hyperlipidemia     Hypertension     Sleep apnea        Current Outpatient Prescriptions   Medication Sig    metoprolol succinate (TOPROL-XL) 25 mg XL tablet Take  by mouth daily.  mometasone-formoterol (DULERA) 200-5 mcg/actuation HFA inhaler Take 2 Puffs by inhalation two (2) times a day.  hydrALAZINE (APRESOLINE) 25 mg tablet Take 25 mg by mouth two (2) times a day.  ergocalciferol (VITAMIN D2) 50,000 unit capsule Take 50,000 Units by mouth every thirty (30) days.  gabapentin (NEURONTIN) 600 mg tablet Take  by mouth three (3) times daily.     methIMAzole (TAPAZOLE) 10 mg tablet Take 10 mg alternating with 5 mg    NOVOLIN N 100 unit/mL injection INJECT 30 UNITS EVERY MORNING & 25 UNITS EVERY EVENING. (STOP [id number]/30)    insulin lispro (HUMALOG) 100 unit/mL injection Use with SSI Max units daily: 45    Insulin Syringe-Needle U-100 (ULTICARE) 0.5 mL 31 gauge x 5/16 syrg Use to inject insulin twice daily Dx Code: E11.65    glucose blood VI test strips (ASCENSIA CONTOUR) strip Test blood glucose 3 times daily Dx Code: E11.65    pravastatin (PRAVACHOL) 40 mg tablet Take 40 mg by mouth nightly.  sodium bicarbonate 325 mg tablet Take 650 mg by mouth three (3) times daily.  zidovudine (RETROVIR) 300 mg tablet Take 300 mg by mouth two (2) times a day.  aspirin delayed-release 81 mg tablet Take 81 mg by mouth daily.  furosemide (LASIX) 20 mg tablet Take 40 mg by mouth daily.  amLODIPine-benazepril (LOTREL) 10-20 mg per capsule Take 1 Cap by mouth daily.  FERROUS SULFATE PO Take 325 Tabs by mouth two (2) times a day.  atazanavir (REYATAZ) 300 mg capsule Take 300 mg by mouth Daily (before breakfast).  lamiVUDine (EPIVIR) 300 mg tablet Take 150 mg by mouth daily.  ritonavir (NORVIR) 100 mg tablet Take 100 mg by mouth two (2) times daily (with meals).  traZODone (DESYREL) 50 mg tablet Take  by mouth nightly.  gabapentin (NEURONTIN) 100 mg capsule Take 1 Cap by mouth two (2) times a day.  atenolol (TENORMIN) 100 mg tablet Take 1 Tab by mouth daily. Stop Propranolol     No current facility-administered medications for this visit. Review of Systems:  - Constitutional Symptoms: no fevers, chills,   - Gastrointestinal: no dysphagia or abdominal pain  - Musculoskeletal: + joint pains + weakness  - Integumentary: no rashes  - Neurological: no numbness, tingling, or headaches  - Psychiatric: no depression or anxiety  - Endocrine: no heat intolerance, no polyuria or polydipsia    Physical Examination:  - Blood pressure 134/58, pulse 75, temperature 97.6 °F (36.4 °C), temperature source Oral, resp. rate 16, height 5' 6\" (1.676 m), weight 220 lb 12.8 oz (100.2 kg), SpO2 96 %. Body mass index is 35.64 kg/(m^2).   - General: pleasant, no distress, good eye contact  - HEENT: no exopthalmos, no periorbital edema, no scleral/conjunctival injection, EOMI, no lid lag or stare  - Neck: supple, + thyromegaly,no  nodules, lymph nodes,   - Cardiovascular: regular,  normal S1 and S2, no murmurs  - Respiratory: clear to auscultation bilaterally  - Gastrointestinal: soft, nontender, nondistended, BS +  - LE - left ankle swollen , tender and no warmth  - Neurological: alert and oriented   - Psychiatric: normal mood and affect  - Skin - normal turgor    Data Reviewed:       Lab Results   Component Value Date/Time    TSH <0.006 10/16/2017 09:42 AM    T4, Free 2.52 10/16/2017 09:42 AM        [] Reviewed labs    Assessment/Plan:     1. Graves disease    2. Type 2 diabetes mellitus with hyperglycemia, with long-term current use of insulin (HCC)    3. Goiter diffuse    4. Hyperthyroidism      1. Graves disease /Goiter     MMI 10 mg alternate with 5 mg  WBC count is normal.    Declined  radioactive iodine therapy. TFTs are worse - discussed about ALATORRE again and she is willing to do this time   Was dx with CHF - discussed worsening due to Graves     2. HIV/AIDS:  Follow up with infectious disease . We have to follow liver function as well as CBC while on Methimazole given comorbidity. 3 HTN - Atenolol     4 DM type 2 -  Lab Results   Component Value Date/Time    Hemoglobin A1c 7.1 10/16/2017 09:42 AM    Hemoglobin A1c, External 7.7 12/21/2016     NPH 25 units in AM and 20 units     5 Anemia - has HIV , follow up with PCP    Follow-up Disposition:  Return in about 2 months (around 12/23/2017). Thank you for allowing me to participate in the care of this patient.     Cyn Tanner MD

## 2017-10-23 NOTE — MR AVS SNAPSHOT
Visit Information Date & Time Provider Department Dept. Phone Encounter #  
 10/23/2017  2:45 PM Aj Bryson MD [facility] [phone] [id number] Follow-up Instructions Return in about 2 months (around 12/23/2017). Upcoming Health Maintenance Date Due Hepatitis C Screening 1955 FOOT EXAM Q1 8/20/1966 EYE EXAM RETINAL OR DILATED Q1 8/20/1966 Pneumococcal 19-64 Highest Risk (1 of 3 - PCV13) 8/20/1975 DTaP/Tdap/Td series (1 - Tdap) 8/20/1977 PAP AKA CERVICAL CYTOLOGY 8/20/1977 BREAST CANCER SCRN MAMMOGRAM 8/20/2006 FOBT Q 1 YEAR AGE 50-75 8/20/2006 ZOSTER VACCINE AGE 60> 6/20/2016 INFLUENZA AGE 9 TO ADULT 8/1/2017 LIPID PANEL Q1 3/16/2018 HEMOGLOBIN A1C Q6M 4/16/2018 MICROALBUMIN Q1 6/16/2018 Allergies as of 10/23/2017  Review Complete On: 10/23/2017 By: Mora Ramirez LPN Severity Noted Reaction Type Reactions Pcn [Penicillins]  03/17/2016    Unknown (comments) Happened as a child Current Immunizations  Never Reviewed No immunizations on file. Not reviewed this visit You Were Diagnosed With   
  
 Codes Comments Type 2 diabetes mellitus with hyperglycemia, with long-term current use of insulin (HCC)    -  Primary ICD-10-CM: E11.65, Z79.4 ICD-9-CM: 250.00, 790.29, V58.67 Goiter diffuse     ICD-10-CM: E04.9 ICD-9-CM: 240.9 Hyperthyroidism     ICD-10-CM: E05.90 ICD-9-CM: 242.90 Vitals BP Pulse Temp Resp Height(growth percentile) Weight(growth percentile) 134/58 (BP 1 Location: Left arm, BP Patient Position: Sitting) 75 97.6 °F (36.4 °C) (Oral) 16 5' 6\" (1.676 m) 220 lb 12.8 oz (100.2 kg) SpO2 BMI OB Status Smoking Status 96% 35.64 kg/m2 Unknown Current Every Day Smoker Vitals History BMI and BSA Data Body Mass Index Body Surface Area  
 35.64 kg/m 2 2.16 m 2 Preferred Pharmacy Pharmacy Name Phone  [facility] [address] , [address] [place] [address] [phone] Your Updated Medication List  
  
   
This list is accurate as of: 10/23/17  3:43 PM.  Always use your most recent med list. amLODIPine-benazepril 10-20 mg per capsule Commonly known as:  Daniel Holster Take 1 Cap by mouth daily. aspirin delayed-release 81 mg tablet Take 81 mg by mouth daily. atenolol 100 mg tablet Commonly known as:  TENORMIN Take 1 Tab by mouth daily. Stop Propranolol DULERA 200-5 mcg/actuation HFA inhaler Generic drug:  mometasone-formoterol Take 2 Puffs by inhalation two (2) times a day. FERROUS SULFATE PO Take 325 Tabs by mouth two (2) times a day. furosemide 20 mg tablet Commonly known as:  LASIX Take 40 mg by mouth daily. * gabapentin 600 mg tablet Commonly known as:  NEURONTIN Take  by mouth three (3) times daily. * gabapentin 100 mg capsule Commonly known as:  NEURONTIN Take 1 Cap by mouth two (2) times a day. glucose blood VI test strips strip Commonly known as:  Ascensia CONTOUR Test blood glucose 3 times daily Dx Code: E11.65  
  
 hydrALAZINE 25 mg tablet Commonly known as:  APRESOLINE Take 25 mg by mouth two (2) times a day. insulin lispro 100 unit/mL injection Commonly known as:  HUMALOG Use with SSI Max units daily: 45 Insulin Syringe-Needle U-100 0.5 mL 31 gauge x 5/16 Syrg Commonly known as:  Lonia Doing Use to inject insulin twice daily Dx Code: E11.65  
  
 lamiVUDine 300 mg tablet Commonly known as:  Algie Northern Take 150 mg by mouth daily. methIMAzole 10 mg tablet Commonly known as:  TAPAZOLE Take 10 mg alternating with 5 mg  
  
 metoprolol succinate 25 mg XL tablet Commonly known as:  TOPROL-XL Take  by mouth daily. NORVIR 100 mg tablet Generic drug:  ritonavir Take 100 mg by mouth two (2) times daily (with meals). NovoLIN N 100 unit/mL injection Generic drug: insulin NPH INJECT 30 UNITS EVERY MORNING & 25 UNITS EVERY EVENING. (STOP CHYNKEC20/30) pravastatin 40 mg tablet Commonly known as:  PRAVACHOL Take 40 mg by mouth nightly. REYATAZ 300 mg capsule Generic drug:  atazanavir Take 300 mg by mouth Daily (before breakfast). sodium bicarbonate 325 mg tablet Take 650 mg by mouth three (3) times daily. traZODone 50 mg tablet Commonly known as:  Angel Lipps Take  by mouth nightly. VITAMIN D2 50,000 unit capsule Generic drug:  ergocalciferol Take 50,000 Units by mouth every thirty (30) days. zidovudine 300 mg tablet Commonly known as:  RETROVIR Take 300 mg by mouth two (2) times a day. * Notice: This list has 2 medication(s) that are the same as other medications prescribed for you. Read the directions carefully, and ask your doctor or other care provider to review them with you. Follow-up Instructions Return in about 2 months (around 12/23/2017). Patient Instructions We will plan for iodine treatment  On Nov 17 th , stop Methimazole on Nov 12 th  
 
NPH 25 units in AM and 20 units at bedtime Humalog fast acting for sugars as per the scale below Blood sugar  Breakfast/Lunch/Dinner 150-200  Add  3  Units 201-250  Add  6 Units 251-300  Add  9 Units 301-350  Add 12  Units 351-400  Add 15  Units Radioactive Iodine: What to Expect at [facility] Your Recovery Radioactive iodine is absorbed and concentrated by the thyroid gland. You get it in liquid or pill form. The radiation will pass out of your body through your urine within days. Until that time, you will give off radiation in your sweat, your saliva, your urine, and anything else that comes out of your body. It is important to avoid exposing other people to the radioactivity from your body. Your doctor will give you more written instructions. Follow these carefully.  The instructions will tell you how far to stay away from people and how long you need to follow the precautions listed below. They will list other ways to keep other people safe. They will also tell you when it will be safe to go out, go to work, and do other activities. How can you care for yourself at home? General recommendations · For a period of time, you will need to keep your distance from other people, especially young children and pregnant women. · Avoid close contact, kissing, and sexual activity. You may need to sleep in a separate bed from your partner. · Keep the toilet very clean. Men should urinate sitting down to avoid splashing. Flush the toilet 2 or 3 times after each use. Wash your hands well with soap and lots of water each time you use the toilet. · Rinse the bathroom sink and tub well after you use them. · Use separate towels, washcloths, and sheets. Wash these and your personal clothing by themselves. Don't wash them with other people's laundry. · You may want to use a special plastic trash bag for all your trash, such as bandages, paper or plastic dishes, menstrual pads, tissues, or paper towels. Talk to your treatment facility to see if they will handle the disposal. Or after 80 days, this bag can be thrown out with your other trash. · Wash your dishes in a  or by hand. If you use disposable dishes, they must be thrown away in the special plastic trash bag. · Don't cook for other people. If you must cook, use plastic gloves. Then throw them away in the special plastic trash bag. Don't share cups, dishes, or utensils. Pregnancy and children · Your doctor will tell you when it is safe to have sex and become pregnant. · You should not breastfeed your baby after you have been treated with radioactive iodine. Ask your doctor when it's safe to breastfeed. Travel · Don't take public transportation. If you are able, it's best to drive yourself.  
· It is important to prepare for any problems you may have at airport security. People who have had radioactive iodine treatment can set off the radiation detection machines in airports for a week to 10 days. Check with local authorities about any steps or permission you may need to travel. · If you plan to travel on the interstate, you may set off radiation detectors. Most police and transportation workers are aware of medical radiation, but it may help to carry some paperwork from your doctor. Follow-up care is a key part of your treatment and safety. Be sure to make and go to all appointments, and call your doctor if you are having problems. It's also a good idea to know your test results and keep a list of the medicines you take. When should you call for help? Watch closely for any changes in your health, and be sure to contact your doctor if: 
· You have a sore throat. · You vomit. · You have diarrhea. Where can you learn more? Go to http://bridget-agustín.info/. Enter J741 in the search box to learn more about \"Radioactive Iodine: What to Expect at Home. \" Current as of: July 28, 2016 Content Version: 11.3 © 2973-2258 [employer]. Care instructions adapted under license by [employer] (which disclaims liability or warranty for this information). If you have questions about a medical condition or this instruction, always ask your healthcare professional. [address] any warranty or liability for your use of this information. Introducing [facility]! Melita Oro introduces [employer] patient portal. Now you can access parts of your medical record, email your doctor's office, and request medication refills online. 1. In your internet browser, go to [email or web address] 2. Click on the First Time User? Click Here link in the Sign In box. You will see the New Member Sign Up page. 3. Enter your [employer] Access Code exactly as it appears below.  You will not need to use this code after youve completed the sign-up process. If you do not sign up before the expiration date, you must request a new code. · [employer] Access Code: [email or web address] Expires: 1/21/2018  3:43 PM 
 
4. Enter the last four digits of your Social Security Number (xxxx) and Date of Birth (mm/dd/yyyy) as indicated and click Submit. You will be taken to the next sign-up page. 5. Create a [employer] ID. This will be your [employer] login ID and cannot be changed, so think of one that is secure and easy to remember. 6. Create a [employer] password. You can change your password at any time. 7. Enter your Password Reset Question and Answer. This can be used at a later time if you forget your password. 8. Enter your e-mail address. You will receive e-mail notification when new information is available in [address]. 9. Click Sign Up. You can now view and download portions of your medical record. 10. Click the Download Summary menu link to download a portable copy of your medical information. If you have questions, please visit the Frequently Asked Questions section of the [employer] website. Remember, [employer] is NOT to be used for urgent needs. For medical emergencies, dial 911. Now available from your iPhone and Android! Please provide this summary of care documentation to your next provider. Your primary care clinician is listed as Ranjit Roy. If you have any questions after today's visit, please call [phone].

## 2017-12-08 LAB
CREATININE, EXTERNAL: 2.19
HBA1C MFR BLD HPLC: 7.1 %

## 2017-12-13 ENCOUNTER — TELEPHONE (OUTPATIENT)
Dept: ENDOCRINOLOGY | Age: 62
End: 2017-12-13

## 2017-12-13 NOTE — TELEPHONE ENCOUNTER
Pt states for the past month she has not been able to fully open her eyes. They have been swollen almost to the point where they are shut. She states she saw Dr Taylor Ramirez on 12/11/2017 and it was suggested the swollen eyes were being caused by methimazole. Pt states her TSH is off and is asking to speak with Dr Fazal Ivan about what is going on. She says she can barely see.

## 2017-12-14 NOTE — TELEPHONE ENCOUNTER
Called and LVM  Talked to Dr Karen Stubbs - she did not think Methimazole was the cause of swollen eyes.  She has CFH , CKD which can cause periorbital swelling     Need labs bnv TSH FT4  Has an appointment next week  Need to see eye doctor if it is really bad

## 2017-12-20 ENCOUNTER — TELEPHONE (OUTPATIENT)
Dept: ENDOCRINOLOGY | Age: 62
End: 2017-12-20

## 2017-12-29 DIAGNOSIS — E05.90 HYPERTHYROIDISM: ICD-10-CM

## 2017-12-29 DIAGNOSIS — Z79.4 UNCONTROLLED TYPE 2 DIABETES MELLITUS WITH HYPERGLYCEMIA, WITH LONG-TERM CURRENT USE OF INSULIN (HCC): ICD-10-CM

## 2017-12-29 DIAGNOSIS — E04.0 GOITER DIFFUSE: ICD-10-CM

## 2017-12-29 DIAGNOSIS — E11.65 UNCONTROLLED TYPE 2 DIABETES MELLITUS WITH HYPERGLYCEMIA, WITH LONG-TERM CURRENT USE OF INSULIN (HCC): ICD-10-CM

## 2017-12-29 RX ORDER — METHIMAZOLE 10 MG/1
TABLET ORAL
Qty: 30 TAB | Refills: 5 | Status: SHIPPED | OUTPATIENT
Start: 2017-12-29 | End: 2018-04-27 | Stop reason: SDUPTHER

## 2018-03-19 DIAGNOSIS — Z79.4 UNCONTROLLED TYPE 2 DIABETES MELLITUS WITH HYPERGLYCEMIA, WITH LONG-TERM CURRENT USE OF INSULIN (HCC): ICD-10-CM

## 2018-03-19 DIAGNOSIS — E11.65 UNCONTROLLED TYPE 2 DIABETES MELLITUS WITH HYPERGLYCEMIA, WITH LONG-TERM CURRENT USE OF INSULIN (HCC): ICD-10-CM

## 2018-03-19 RX ORDER — NAPROXEN SODIUM 220 MG
TABLET ORAL
Qty: 60 SYRINGE | Refills: 11 | Status: SHIPPED | OUTPATIENT
Start: 2018-03-19

## 2018-04-16 DIAGNOSIS — Z79.4 UNCONTROLLED TYPE 2 DIABETES MELLITUS WITH HYPERGLYCEMIA, WITH LONG-TERM CURRENT USE OF INSULIN (HCC): ICD-10-CM

## 2018-04-16 DIAGNOSIS — E11.65 UNCONTROLLED TYPE 2 DIABETES MELLITUS WITH HYPERGLYCEMIA, WITH LONG-TERM CURRENT USE OF INSULIN (HCC): ICD-10-CM

## 2018-04-16 DIAGNOSIS — E05.90 HYPERTHYROIDISM: ICD-10-CM

## 2018-04-16 DIAGNOSIS — E04.0 GOITER DIFFUSE: ICD-10-CM

## 2018-04-27 ENCOUNTER — OFFICE VISIT (OUTPATIENT)
Dept: ENDOCRINOLOGY | Age: 63
End: 2018-04-27

## 2018-04-27 VITALS
HEART RATE: 75 BPM | BODY MASS INDEX: 31.39 KG/M2 | WEIGHT: 195.3 LBS | DIASTOLIC BLOOD PRESSURE: 55 MMHG | SYSTOLIC BLOOD PRESSURE: 159 MMHG | HEIGHT: 66 IN | RESPIRATION RATE: 16 BRPM | OXYGEN SATURATION: 98 %

## 2018-04-27 DIAGNOSIS — E05.90 HYPERTHYROIDISM: ICD-10-CM

## 2018-04-27 DIAGNOSIS — Z79.4 UNCONTROLLED TYPE 2 DIABETES MELLITUS WITH HYPERGLYCEMIA, WITH LONG-TERM CURRENT USE OF INSULIN (HCC): ICD-10-CM

## 2018-04-27 DIAGNOSIS — E04.0 GOITER DIFFUSE: ICD-10-CM

## 2018-04-27 DIAGNOSIS — Z79.4 TYPE 2 DIABETES MELLITUS WITH HYPERGLYCEMIA, WITH LONG-TERM CURRENT USE OF INSULIN (HCC): Primary | ICD-10-CM

## 2018-04-27 DIAGNOSIS — E11.65 TYPE 2 DIABETES MELLITUS WITH HYPERGLYCEMIA, WITH LONG-TERM CURRENT USE OF INSULIN (HCC): Primary | ICD-10-CM

## 2018-04-27 DIAGNOSIS — E11.65 UNCONTROLLED TYPE 2 DIABETES MELLITUS WITH HYPERGLYCEMIA, WITH LONG-TERM CURRENT USE OF INSULIN (HCC): ICD-10-CM

## 2018-04-27 RX ORDER — ALLOPURINOL 100 MG/1
TABLET ORAL DAILY
COMMUNITY

## 2018-04-27 RX ORDER — INSULIN LISPRO 100 [IU]/ML
INJECTION, SOLUTION INTRAVENOUS; SUBCUTANEOUS
Qty: 20 ML | Refills: 5 | Status: SHIPPED | OUTPATIENT
Start: 2018-04-27 | End: 2019-01-01 | Stop reason: SDUPTHER

## 2018-04-27 RX ORDER — METHIMAZOLE 10 MG/1
TABLET ORAL
Qty: 30 TAB | Refills: 5 | Status: SHIPPED | OUTPATIENT
Start: 2018-04-27 | End: 2018-11-05 | Stop reason: SDUPTHER

## 2018-04-27 RX ORDER — TRIAMCINOLONE ACETONIDE 1 MG/G
CREAM TOPICAL 2 TIMES DAILY
COMMUNITY

## 2018-04-27 NOTE — PATIENT INSTRUCTIONS
We will plan for iodine treatment  On Nov 17 th , stop Methimazole on Nov 12 th     Novolin N 30 units in AM and 20 units at bedtime    Humalog fast acting for sugars as per the scale below     Blood sugar  Breakfast/Lunch/Dinner         150-200  Add  3  Units       201-250  Add  6 Units       251-300  Add  9 Units       301-350  Add 12  Units        351-400  Add 15  Units            Radioactive Iodine: What to Expect at Home  Your Recovery  Radioactive iodine is absorbed and concentrated by the thyroid gland. You get it in liquid or pill form. The radiation will pass out of your body through your urine within days. Until that time, you will give off radiation in your sweat, your saliva, your urine, and anything else that comes out of your body. It is important to avoid exposing other people to the radioactivity from your body. Your doctor will give you more written instructions. Follow these carefully. The instructions will tell you how far to stay away from people and how long you need to follow the precautions listed below. They will list other ways to keep other people safe. They will also tell you when it will be safe to go out, go to work, and do other activities. How can you care for yourself at home? General recommendations  · For a period of time, you will need to keep your distance from other people, especially young children and pregnant women. · Avoid close contact, kissing, and sexual activity. You may need to sleep in a separate bed from your partner. · Keep the toilet very clean. Men should urinate sitting down to avoid splashing. Flush the toilet 2 or 3 times after each use. Wash your hands well with soap and lots of water each time you use the toilet. · Rinse the bathroom sink and tub well after you use them. · Use separate towels, washcloths, and sheets. Wash these and your personal clothing by themselves. Don't wash them with other people's laundry.   · You may want to use a special plastic trash bag for all your trash, such as bandages, paper or plastic dishes, menstrual pads, tissues, or paper towels. Talk to your treatment facility to see if they will handle the disposal. Or after 80 days, this bag can be thrown out with your other trash. · Wash your dishes in a  or by hand. If you use disposable dishes, they must be thrown away in the special plastic trash bag. · Don't cook for other people. If you must cook, use plastic gloves. Then throw them away in the special plastic trash bag. Don't share cups, dishes, or utensils. Pregnancy and children  · Your doctor will tell you when it is safe to have sex and become pregnant. · You should not breastfeed your baby after you have been treated with radioactive iodine. Ask your doctor when it's safe to breastfeed. Travel  · Don't take public transportation. If you are able, it's best to drive yourself. · It is important to prepare for any problems you may have at airport security. People who have had radioactive iodine treatment can set off the radiation detection machines in airports for a week to 10 days. Check with local authorities about any steps or permission you may need to travel. · If you plan to travel on the interstate, you may set off radiation detectors. Most police and transportation workers are aware of medical radiation, but it may help to carry some paperwork from your doctor. Follow-up care is a key part of your treatment and safety. Be sure to make and go to all appointments, and call your doctor if you are having problems. It's also a good idea to know your test results and keep a list of the medicines you take. When should you call for help? Watch closely for any changes in your health, and be sure to contact your doctor if:  · You have a sore throat. · You vomit. · You have diarrhea. Where can you learn more? Go to http://bridget-agustín.info/.   Enter Z183 in the search box to learn more about \"Radioactive Iodine: What to Expect at Home. \"  Current as of: July 28, 2016  Content Version: 11.3  © 5102-3690 [employer]. Care instructions adapted under license by [employer] (which disclaims liability or warranty for this information). If you have questions about a medical condition or this instruction, always ask your healthcare professional. [address] any warranty or liability for your use of this information.

## 2018-04-27 NOTE — MR AVS SNAPSHOT
[address] [facility] [address] 
[phone] Patient: Amanda Lawrence MRN: [id number] BF:[id number][id number] Visit Information Date & Time Provider Department Dept. Phone Encounter #  
 4/27/2018 10:15 AM Sybil Allan MD [facility] Diabetes & Endocrinology [phone] [id number] Follow-up Instructions Return in about 3 months (around 7/27/2018). Upcoming Health Maintenance Date Due Hepatitis C Screening 1955 FOOT EXAM Q1 8/20/1966 EYE EXAM RETINAL OR DILATED Q1 8/20/1966 Pneumococcal 19-64 Highest Risk (1 of 3 - PCV13) 8/20/1975 DTaP/Tdap/Td series (1 - Tdap) 8/20/1977 PAP AKA CERVICAL CYTOLOGY 8/20/1977 BREAST CANCER SCRN MAMMOGRAM 8/20/2006 FOBT Q 1 YEAR AGE 50-75 8/20/2006 ZOSTER VACCINE AGE 60> 6/20/2016 LIPID PANEL Q1 3/16/2018 MEDICARE YEARLY EXAM 4/12/2018 HEMOGLOBIN A1C Q6M 4/16/2018 MICROALBUMIN Q1 6/16/2018 Influenza Age 5 to Adult 8/1/2018 Allergies as of 4/27/2018  Review Complete On: 4/27/2018 By: Betzaida Lindsey LPN Severity Noted Reaction Type Reactions Pcn [Penicillins]  03/17/2016    Unknown (comments) Happened as a child Current Immunizations  Never Reviewed No immunizations on file. Not reviewed this visit You Were Diagnosed With   
  
 Codes Comments Type 2 diabetes mellitus with hyperglycemia, with long-term current use of insulin (HCC)    -  Primary ICD-10-CM: E11.65, Z79.4 ICD-9-CM: 250.00, 790.29, V58.67 Goiter diffuse     ICD-10-CM: E04.9 ICD-9-CM: 240.9 Hyperthyroidism     ICD-10-CM: E05.90 ICD-9-CM: 242.90 Vitals BP Pulse Resp Height(growth percentile) Weight(growth percentile) SpO2  
 159/55 (BP 1 Location: Right arm, BP Patient Position: Sitting) 75 16 5' 6\" (1.676 m) 195 lb 4.8 oz (88.6 kg) 98% BMI OB Status Smoking Status 31.52 kg/m2 Unknown Current Every Day Smoker Vitals History BMI and BSA Data Body Mass Index Body Surface Area  
 31.52 kg/m 2 2.03 m 2 Preferred Pharmacy Pharmacy Name Phone [address], [address] [phone] Your Updated Medication List  
  
   
This list is accurate as of 4/27/18 11:05 AM.  Always use your most recent med list.  
  
  
  
  
 allopurinol 100 mg tablet Commonly known as:  Shelvy [place] Take  by mouth daily. amLODIPine-benazepril 10-20 mg per capsule Commonly known as:  Sherleen Cover Take 1 Cap by mouth daily. aspirin delayed-release 81 mg tablet Take 81 mg by mouth daily. atenolol 100 mg tablet Commonly known as:  TENORMIN Take 1 Tab by mouth daily. Stop Propranolol DULERA 200-5 mcg/actuation HFA inhaler Generic drug:  mometasone-formoterol Take 2 Puffs by inhalation two (2) times a day. FERROUS SULFATE PO Take 325 Tabs by mouth two (2) times a day. furosemide 20 mg tablet Commonly known as:  LASIX Take 80 mg by mouth two (2) times a day. * gabapentin 600 mg tablet Commonly known as:  NEURONTIN Take  by mouth three (3) times daily. * gabapentin 100 mg capsule Commonly known as:  NEURONTIN Take 1 Cap by mouth two (2) times a day. glucose blood VI test strips strip Commonly known as:  Ascensia CONTOUR Test blood glucose 3 times daily Dx Code: E11.65  
  
 hydrALAZINE 25 mg tablet Commonly known as:  APRESOLINE Take 25 mg by mouth three (3) times daily. insulin lispro 100 unit/mL injection Commonly known as:  HUMALOG Use with SSI Max units daily: 45 Insulin Syringe-Needle U-100 0.5 mL 31 gauge x 5/16 Syrg USE FOR INSULIN 2 TIMES A DAY  
  
 lamiVUDine 300 mg tablet Commonly known as:  Eliu Branden Take 150 mg by mouth daily. methIMAzole 10 mg tablet Commonly known as:  TAPAZOLE Take 10 mg alternating with 5 mg  
  
 metoprolol succinate 25 mg XL tablet Commonly known as:  TOPROL-XL Take  by mouth daily. mupirocin calcium 2 % nasal ointment Commonly known as:  BACTROBAN  
by Both Nostrils route two (2) times a day. NORVIR 100 mg tablet Generic drug:  ritonavir Take 100 mg by mouth daily (with breakfast). NovoLIN N NPH U-100 Insulin 100 unit/mL injection Generic drug:  insulin NPH INJECT 30 UNITS EVERY MORNING & 25 UNITS EVERY EVENING. (STOP [id number]/30) pravastatin 40 mg tablet Commonly known as:  PRAVACHOL Take 40 mg by mouth nightly. REYATAZ 300 mg capsule Generic drug:  atazanavir Take 300 mg by mouth Daily (before breakfast). sodium bicarbonate 325 mg tablet Take 650 mg by mouth three (3) times daily. traZODone 50 mg tablet Commonly known as:  Sil Faes Take  by mouth nightly. triamcinolone acetonide 0.1 % topical cream  
Commonly known as:  KENALOG Apply  to affected area two (2) times a day. use thin layer VITAMIN D2 50,000 unit capsule Generic drug:  ergocalciferol Take 50,000 Units by mouth every thirty (30) days. zidovudine 300 mg tablet Commonly known as:  RETROVIR Take 300 mg by mouth two (2) times a day. * Notice: This list has 2 medication(s) that are the same as other medications prescribed for you. Read the directions carefully, and ask your doctor or other care provider to review them with you. Follow-up Instructions Return in about 3 months (around 7/27/2018). Patient Instructions We will plan for iodine treatment  On Nov 17 th , stop Methimazole on Nov 12 th  
 
Novolin N 30 units in AM and 20 units at bedtime Humalog fast acting for sugars as per the scale below Blood sugar  Breakfast/Lunch/Dinner 150-200  Add  3  Units 201-250  Add  6 Units 251-300  Add  9 Units 301-350  Add 12  Units 351-400  Add 15  Units Radioactive Iodine: What to Expect at [facility] Your Recovery Radioactive iodine is absorbed and concentrated by the thyroid gland. You get it in liquid or pill form. The radiation will pass out of your body through your urine within days. Until that time, you will give off radiation in your sweat, your saliva, your urine, and anything else that comes out of your body. It is important to avoid exposing other people to the radioactivity from your body. Your doctor will give you more written instructions. Follow these carefully. The instructions will tell you how far to stay away from people and how long you need to follow the precautions listed below. They will list other ways to keep other people safe. They will also tell you when it will be safe to go out, go to work, and do other activities. How can you care for yourself at home? General recommendations · For a period of time, you will need to keep your distance from other people, especially young children and pregnant women. · Avoid close contact, kissing, and sexual activity. You may need to sleep in a separate bed from your partner. · Keep the toilet very clean. Men should urinate sitting down to avoid splashing. Flush the toilet 2 or 3 times after each use. Wash your hands well with soap and lots of water each time you use the toilet. · Rinse the bathroom sink and tub well after you use them. · Use separate towels, washcloths, and sheets. Wash these and your personal clothing by themselves. Don't wash them with other people's laundry. · You may want to use a special plastic trash bag for all your trash, such as bandages, paper or plastic dishes, menstrual pads, tissues, or paper towels. Talk to your treatment facility to see if they will handle the disposal. Or after 80 days, this bag can be thrown out with your other trash. · Wash your dishes in a  or by hand. If you use disposable dishes, they must be thrown away in the special plastic trash bag. · Don't cook for other people.  If you must cook, use plastic gloves. Then throw them away in the special plastic trash bag. Don't share cups, dishes, or utensils. Pregnancy and children · Your doctor will tell you when it is safe to have sex and become pregnant. · You should not breastfeed your baby after you have been treated with radioactive iodine. Ask your doctor when it's safe to breastfeed. Travel · Don't take public transportation. If you are able, it's best to drive yourself. · It is important to prepare for any problems you may have at airport security. People who have had radioactive iodine treatment can set off the radiation detection machines in airports for a week to 10 days. Check with local authorities about any steps or permission you may need to travel. · If you plan to travel on the interstate, you may set off radiation detectors. Most police and transportation workers are aware of medical radiation, but it may help to carry some paperwork from your doctor. Follow-up care is a key part of your treatment and safety. Be sure to make and go to all appointments, and call your doctor if you are having problems. It's also a good idea to know your test results and keep a list of the medicines you take. When should you call for help? Watch closely for any changes in your health, and be sure to contact your doctor if: 
· You have a sore throat. · You vomit. · You have diarrhea. Where can you learn more? Go to http://bridget-agustín.info/. Enter Y126 in the search box to learn more about \"Radioactive Iodine: What to Expect at Home. \" Current as of: July 28, 2016 Content Version: 11.3 © 6932-9735 Healthwise, Incorporated. Care instructions adapted under license by [employer] (which disclaims liability or warranty for this information).  If you have questions about a medical condition or this instruction, always ask your healthcare professional. [address] any warranty or liability for your use of this information. Introducing [facility]! [facility] introduces FUELUP patient portal. Now you can access parts of your medical record, email your doctor's office, and request medication refills online. 1. In your internet browser, go to [email or web address] 2. Click on the First Time User? Click Here link in the Sign In box. You will see the New Member Sign Up page. 3. Enter your FUELUP Access Code exactly as it appears below. You will not need to use this code after youve completed the sign-up process. If you do not sign up before the expiration date, you must request a new code. · FUELUP Access Code: [email or web address] Expires: 7/26/2018 11:04 AM 
 
4. Enter the last four digits of your Social Security Number (xxxx) and Date of Birth (mm/dd/yyyy) as indicated and click Submit. You will be taken to the next sign-up page. 5. Create a FUELUP ID. This will be your FUELUP login ID and cannot be changed, so think of one that is secure and easy to remember. 6. Create a FUELUP password. You can change your password at any time. 7. Enter your Password Reset Question and Answer. This can be used at a later time if you forget your password. 8. Enter your e-mail address. You will receive e-mail notification when new information is available in [address] 9. Click Sign Up. You can now view and download portions of your medical record. 10. Click the Download Summary menu link to download a portable copy of your medical information. If you have questions, please visit the Frequently Asked Questions section of the FUELUP website. Remember, FUELUP is NOT to be used for urgent needs. For medical emergencies, dial 911. Now available from your iPhone and Android! Please provide this summary of care documentation to your next provider. Your primary care clinician is listed as Tatyana Dillon.  If you have any questions after today's visit, please call [phone].

## 2018-04-27 NOTE — PROGRESS NOTES
Joanna Lam is a 65 y.o. female here for   Chief Complaint   Patient presents with    Diabetes    Thyroid Problem       Functional glucose monitor and record keeping system? - yes  Eye exam within last year? - none  Foot exam within last year? - dure today    1. Have you been to the ER, urgent care clinic since your last visit? Hospitalized since your last visit? - hospitalized for fluid in March 2018    2. Have you seen or consulted any other health care providers outside of the [facility] since your last visit?   Include any pap smears or colon screening.- PCP      Lab Results   Component Value Date/Time    Hemoglobin A1c 7.1 (H) 10/16/2017 09:42 AM    Hemoglobin A1c, External 7.7 12/21/2016       Wt Readings from Last 3 Encounters:   04/27/18 195 lb 4.8 oz (88.6 kg)   10/23/17 220 lb 12.8 oz (100.2 kg)   06/23/17 227 lb (103 kg)     Temp Readings from Last 3 Encounters:   10/23/17 97.6 °F (36.4 °C) (Oral)   06/23/17 97.4 °F (36.3 °C) (Oral)   03/21/17 97.1 °F (36.2 °C) (Oral)     BP Readings from Last 3 Encounters:   04/27/18 159/55   10/23/17 134/58   06/23/17 122/48     Pulse Readings from Last 3 Encounters:   04/27/18 75   10/23/17 75   06/23/17 (!) 58

## 2018-04-27 NOTE — PROGRESS NOTES
[facility]               Maru Fields MD        [address] 78 [phone] Fax [phone]           Patient Information  Date:4/28/2018  Name : Luis Soto 65 y.o.     YOB: 1955         Referred by: Hal Jackson MD         Chief Complaint   Patient presents with    Diabetes    Thyroid Problem       History of present illness    Luis Soto is a 65 y.o. female  here for fu  of thyroid. In July 2015, she was found to have suppressed TSH with elevated free T4. She has a history of HIV/AIDS and is on antiretroviral therapy. She is followed by an infectious disease . She has no history of leukopenia. She was hospitalized again for CHF exacerbation  Missing insulin. Blood glucose are high. Insulin dosing was changed posthospitalization. If the blood glucose is normal in the evening she does not take the insulin  No hypoglycemia  Diet could be healthy          CPAP was taken from her as she was not using it        Wt Readings from Last 3 Encounters:   04/27/18 195 lb 4.8 oz (88.6 kg)   10/23/17 220 lb 12.8 oz (100.2 kg)   06/23/17 227 lb (103 kg)          Past Medical History:   Diagnosis Date    Diabetes [facility] [address]     HIV (human immunodeficiency virus infection) [facility] [facility]     Hyperlipidemia     Hypertension     Sleep apnea        Current Outpatient Prescriptions   Medication Sig    mupirocin calcium (BACTROBAN) 2 % nasal ointment by Both Nostrils route two (2) times a day.  allopurinol (ZYLOPRIM) 100 mg tablet Take  by mouth daily.  triamcinolone acetonide (KENALOG) 0.1 % topical cream Apply  to affected area two (2) times a day. use thin layer    Insulin Syringe-Needle U-100 0.5 mL 31 gauge x 5/16 syrg USE FOR INSULIN 2 TIMES A DAY    metoprolol succinate (TOPROL-XL) 25 mg XL tablet Take  by mouth daily.     mometasone-formoterol (DULERA) 200-5 mcg/actuation HFA inhaler Take 2 Puffs by inhalation two (2) times a day.  hydrALAZINE (APRESOLINE) 25 mg tablet Take 25 mg by mouth three (3) times daily.  ergocalciferol (VITAMIN D2) 50,000 unit capsule Take 50,000 Units by mouth every thirty (30) days.  gabapentin (NEURONTIN) 100 mg capsule Take 1 Cap by mouth two (2) times a day.  pravastatin (PRAVACHOL) 40 mg tablet Take 40 mg by mouth nightly.  sodium bicarbonate 325 mg tablet Take 650 mg by mouth three (3) times daily.  zidovudine (RETROVIR) 300 mg tablet Take 300 mg by mouth two (2) times a day.  aspirin delayed-release 81 mg tablet Take 81 mg by mouth daily.  furosemide (LASIX) 20 mg tablet Take 80 mg by mouth two (2) times a day.  FERROUS SULFATE PO Take 325 Tabs by mouth two (2) times a day.  atazanavir (REYATAZ) 300 mg capsule Take 300 mg by mouth Daily (before breakfast).  lamiVUDine (EPIVIR) 300 mg tablet Take 150 mg by mouth daily.  ritonavir (NORVIR) 100 mg tablet Take 100 mg by mouth daily (with breakfast).  insulin NPH (NOVOLIN N NPH U-100 INSULIN) 100 unit/mL injection INJECT 30 units in AM and 20 units at bedtime (STOP [id number]/30)    insulin lispro (HUMALOG) 100 unit/mL injection Use with SSI Max units daily: 45    methIMAzole (TAPAZOLE) 10 mg tablet Take 10 mg alternating with 5 mg    gabapentin (NEURONTIN) 600 mg tablet Take  by mouth three (3) times daily.  traZODone (DESYREL) 50 mg tablet Take  by mouth nightly.  glucose blood VI test strips (ASCENSIA CONTOUR) strip Test blood glucose 3 times daily Dx Code: E11.65    amLODIPine-benazepril (LOTREL) 10-20 mg per capsule Take 1 Cap by mouth daily. No current facility-administered medications for this visit.           Review of Systems:  - Constitutional Symptoms: no fevers, chills,   - Gastrointestinal: no dysphagia or abdominal pain  - Musculoskeletal: + joint pains + weakness  - Integumentary: no rashes  - Neurological: no numbness, tingling, or headaches  - Psychiatric: no depression or anxiety  - Endocrine: no heat intolerance, no polyuria or polydipsia    Physical Examination:  - Blood pressure 159/55, pulse 75, resp. rate 16, height 5' 6\" (1.676 m), weight 195 lb 4.8 oz (88.6 kg), SpO2 98 %. Body mass index is 31.52 kg/(m^2). - General: pleasant, no distress, good eye contact  - HEENT: no exopthalmos, no periorbital edema, no scleral/conjunctival injection, EOMI, no lid lag or stare  - Neck: supple, + thyromegaly,no  nodules, lymph nodes,   - Cardiovascular: regular,  normal S1 and S2, no murmurs  - Respiratory: clear to auscultation bilaterally  - Gastrointestinal: soft, nontender, nondistended, BS +  - Neurological: alert and oriented   - Psychiatric: normal mood and affect  Skin - normal turgor    Diabetic foot exam: April 2018    Left:     Vibratory sensation absent   Filament test decreased sensation with micro filament   Pulse DP: 1+    Deformities: None  Right:    Vibratory sensation absent   Filament test decreased sensation with micro filament   Pulse DP: 1+   Deformities: None    Data Reviewed:       Lab Results   Component Value Date/Time    TSH <0.006 (L) 10/16/2017 09:42 AM    T4, Free 2.52 (H) 10/16/2017 09:42 AM        [] Reviewed labs    Assessment/Plan:     1. Type 2 diabetes mellitus with hyperglycemia, with long-term current use of insulin ([facility] [address]    2. Goiter diffuse    3. Hyperthyroidism      1. Graves disease /Goiter     MMI 10 mg alternate with 5 mg  . Discussed radioactive iodine therapy which would be the best course in her case. Declined  radioactive iodine therapy. Was dx with CHF - discussed worsening due to Graves     2. HIV/AIDS:  Follow up with infectious disease . We have to follow liver function as well as CBC while on Methimazole given comorbidity.       3 HTN -compliance discussed  4 DM type 2 -  Lab Results   Component Value Date/Time    Hemoglobin A1c 7.1 (H) 10/16/2017 09:42 AM    Hemoglobin A1c, External 7.7 12/21/2016     NPH 25 units in AM and 20 units     5 Anemia - has HIV , follow up with PCP    Follow-up Disposition:  Return in about 3 months (around 7/27/2018). Thank you for allowing me to participate in the care of this patient.     Yogi Roman MD

## 2018-04-27 NOTE — PROGRESS NOTES
Alla Victoria is a 65 y.o. female here for   Chief Complaint   Patient presents with    Diabetes    Thyroid Problem       Functional glucose monitor and record keeping system? -   Eye exam within last year? -   Foot exam within last year? -     1. Have you been to the ER, urgent care clinic since your last visit? Hospitalized since your last visit? -    2. Have you seen or consulted any other health care providers outside of the [address] [place] [address] since your last visit?   Include any pap smears or colon screening.-      Lab Results   Component Value Date/Time    Hemoglobin A1c 7.1 (H) 10/16/2017 09:42 AM    Hemoglobin A1c, External 7.7 12/21/2016       Wt Readings from Last 3 Encounters:   10/23/17 220 lb 12.8 oz (100.2 kg)   06/23/17 227 lb (103 kg)   03/21/17 226 lb 8 oz (102.7 kg)     Temp Readings from Last 3 Encounters:   10/23/17 97.6 °F (36.4 °C) (Oral)   06/23/17 97.4 °F (36.3 °C) (Oral)   03/21/17 97.1 °F (36.2 °C) (Oral)     BP Readings from Last 3 Encounters:   10/23/17 134/58   06/23/17 122/48   03/21/17 137/53     Pulse Readings from Last 3 Encounters:   10/23/17 75   06/23/17 (!) 58   03/21/17 (!) 54

## 2018-07-13 RX ORDER — INSULIN PUMP SYRINGE, 3 ML
EACH MISCELLANEOUS
Qty: 1 KIT | Refills: 0 | Status: SHIPPED | OUTPATIENT
Start: 2018-07-13

## 2018-07-13 RX ORDER — LANCETS 28 GAUGE
EACH MISCELLANEOUS
Qty: 200 LANCET | Refills: 6 | Status: SHIPPED | OUTPATIENT
Start: 2018-07-13

## 2018-11-05 DIAGNOSIS — E05.90 HYPERTHYROIDISM: ICD-10-CM

## 2018-11-05 DIAGNOSIS — Z79.4 UNCONTROLLED TYPE 2 DIABETES MELLITUS WITH HYPERGLYCEMIA, WITH LONG-TERM CURRENT USE OF INSULIN (HCC): ICD-10-CM

## 2018-11-05 DIAGNOSIS — E04.0 GOITER DIFFUSE: ICD-10-CM

## 2018-11-05 DIAGNOSIS — E11.65 UNCONTROLLED TYPE 2 DIABETES MELLITUS WITH HYPERGLYCEMIA, WITH LONG-TERM CURRENT USE OF INSULIN (HCC): ICD-10-CM

## 2018-11-05 RX ORDER — METHIMAZOLE 10 MG/1
TABLET ORAL
Qty: 30 TAB | Refills: 0 | Status: SHIPPED | OUTPATIENT
Start: 2018-11-05 | End: 2018-12-04 | Stop reason: SDUPTHER

## 2018-11-26 ENCOUNTER — OFFICE VISIT (OUTPATIENT)
Dept: ENDOCRINOLOGY | Age: 63
End: 2018-11-26

## 2018-11-26 ENCOUNTER — TELEPHONE (OUTPATIENT)
Dept: ENDOCRINOLOGY | Age: 63
End: 2018-11-26

## 2018-11-26 VITALS
TEMPERATURE: 96.6 F | BODY MASS INDEX: 31.02 KG/M2 | HEIGHT: 66 IN | OXYGEN SATURATION: 96 % | DIASTOLIC BLOOD PRESSURE: 63 MMHG | SYSTOLIC BLOOD PRESSURE: 153 MMHG | HEART RATE: 67 BPM | WEIGHT: 193 LBS | RESPIRATION RATE: 16 BRPM

## 2018-11-26 DIAGNOSIS — E05.90 HYPERTHYROIDISM: ICD-10-CM

## 2018-11-26 DIAGNOSIS — E11.40 TYPE 2 DIABETES MELLITUS WITH DIABETIC NEUROPATHY, WITH LONG-TERM CURRENT USE OF INSULIN (HCC): Primary | ICD-10-CM

## 2018-11-26 DIAGNOSIS — Z79.4 TYPE 2 DIABETES MELLITUS WITH DIABETIC NEUROPATHY, WITH LONG-TERM CURRENT USE OF INSULIN (HCC): Primary | ICD-10-CM

## 2018-11-26 NOTE — PROGRESS NOTES
[facility]               Joy Mendoza MD        [address] 78 [phone] Fax [phone]           Patient Information  Date:11/27/2018  Name : Franky Dixon 59 y.o.     YOB: 1955         Referred by: Natalie Pedro MD         Chief Complaint   Patient presents with    Diabetes    Thyroid Problem       History of present illness    Franky Dixon is a 59 y.o. female  here for fu  of thyroid. In July 2015, she was found to have suppressed TSH with elevated free T4. She has a history of HIV/AIDS and is on antiretroviral therapy. She is followed by an infectious disease . She has no history of leukopenia. Follow-up is sporadic  Reportedly taking methimazole, no high-grade fever  Did not bring the meter or the logbook  Missing insulin. Blood glucose are high. No hypoglycemia  Diet could be healthy          CPAP was taken from her as she was not using it        Wt Readings from Last 3 Encounters:   11/26/18 193 lb (87.5 kg)   04/27/18 195 lb 4.8 oz (88.6 kg)   10/23/17 220 lb 12.8 oz (100.2 kg)          Past Medical History:   Diagnosis Date    Diabetes ([facility] [address]     HIV (human immunodeficiency virus infection) ([facility] [address]     Hyperlipidemia     Hypertension     Sleep apnea        Current Outpatient Medications   Medication Sig    methIMAzole (TAPAZOLE) 10 mg tablet TAKE ONE TABLET (10MG) ALTERNATING WITH 1/2 TABLET (5MG) DAILY    allopurinol (ZYLOPRIM) 100 mg tablet Take  by mouth daily.  insulin NPH (NOVOLIN N NPH U-100 INSULIN) 100 unit/mL injection INJECT 30 units in AM and 20 units at bedtime (STOP [id number])    insulin lispro (HUMALOG) 100 unit/mL injection Use with SSI Max units daily: 45    metoprolol succinate (TOPROL-XL) 25 mg XL tablet Take  by mouth daily.  mometasone-formoterol (DULERA) 200-5 mcg/actuation HFA inhaler Take 2 Puffs by inhalation two (2) times a day.     hydrALAZINE (APRESOLINE) 25 mg tablet Take 25 mg by mouth three (3) times daily.  ergocalciferol (VITAMIN D2) 50,000 unit capsule Take 50,000 Units by mouth every thirty (30) days.  gabapentin (NEURONTIN) 100 mg capsule Take 1 Cap by mouth two (2) times a day.  pravastatin (PRAVACHOL) 40 mg tablet Take 40 mg by mouth nightly.  sodium bicarbonate 325 mg tablet Take 650 mg by mouth three (3) times daily.  zidovudine (RETROVIR) 300 mg tablet Take 300 mg by mouth two (2) times a day.  aspirin delayed-release 81 mg tablet Take 81 mg by mouth daily.  furosemide (LASIX) 20 mg tablet Take 40 mg by mouth two (2) times a day.  amLODIPine-benazepril (LOTREL) 10-20 mg per capsule Take 1 Cap by mouth daily.  FERROUS SULFATE PO Take 325 Tabs by mouth two (2) times a day.  atazanavir (REYATAZ) 300 mg capsule Take 300 mg by mouth Daily (before breakfast).  lamiVUDine (EPIVIR) 300 mg tablet Take 50 mg by mouth daily.  ritonavir (NORVIR) 100 mg tablet Take 100 mg by mouth daily (with breakfast).  glucose blood VI test strips (TRUE METRIX GLUCOSE TEST STRIP) strip Test 4 times daily. Dx code E11.65    lancets (TRUEPLUS LANCETS) 28 gauge misc Test 4 times daily. Dx code E11.65    Blood-Glucose Meter (TRUE METRIX AIR GLUCOSE METER) monitoring kit Dispense 1 meter. Test 4 times daily. Dx code E11.65    mupirocin calcium (BACTROBAN) 2 % nasal ointment by Both Nostrils route two (2) times a day.  triamcinolone acetonide (KENALOG) 0.1 % topical cream Apply  to affected area two (2) times a day. use thin layer    Insulin Syringe-Needle U-100 0.5 mL 31 gauge x 5/16 syrg USE FOR INSULIN 2 TIMES A DAY     No current facility-administered medications for this visit.           Review of Systems:  - Constitutional Symptoms: no fevers, chills,   - Gastrointestinal: no dysphagia or abdominal pain  - Musculoskeletal: + joint pains + weakness  - Integumentary: no rashes  - Neurological: no numbness, tingling, or headaches  - Psychiatric: no depression or anxiety  - Endocrine: no heat intolerance, no polyuria or polydipsia    Physical Examination:  - Blood pressure 153/63, pulse 67, temperature 96.6 °F (35.9 °C), temperature source Oral, resp. rate 16, height 5' 6\" (1.676 m), weight 193 lb (87.5 kg), SpO2 96 %. Body mass index is 31.15 kg/m². - General: pleasant, no distress, good eye contact  - HEENT: no exopthalmos, no periorbital edema, no scleral/conjunctival injection, EOMI, no lid lag or stare  - Neck: supple, + thyromegaly,no  nodules, lymph nodes,   - Cardiovascular: regular,  normal S1 and S2, no murmurs  - Respiratory: clear to auscultation bilaterally  - Gastrointestinal: soft, nontender, nondistended, BS +  - Neurological: alert and oriented   - Psychiatric: normal mood and affect  Skin - normal turgor    Diabetic foot exam: April 2018    Left:     Vibratory sensation absent   Filament test decreased sensation with micro filament   Pulse DP: 1+    Deformities: None  Right:    Vibratory sensation absent   Filament test decreased sensation with micro filament   Pulse DP: 1+   Deformities: None    Data Reviewed:       Lab Results   Component Value Date/Time    TSH <0.006 (L) 11/26/2018 02:55 PM    T4, Free 2.12 (H) 11/26/2018 02:55 PM        [] Reviewed labs    Assessment/Plan:     1. Type 2 diabetes mellitus with diabetic neuropathy, with long-term current use of insulin ([facility] [address]    2. Hyperthyroidism      1. Graves disease /Goiter     MMI 10 mg alternate with 5 mg  . Discussed radioactive iodine therapy which would be the best course in her case. Not sure if she is taking the medications consistently  Discussed the need for isolation, risk of hypothyroidism post radioactive iodine therapy    Was dx with CHF - discussed worsening due to Graves     2. HIV/AIDS:  Follow up with infectious disease . We have to follow liver function as well as CBC while on Methimazole given comorbidity.       3 HTN -compliance discussed  4 DM type 2 -  Lab Results   Component Value Date/Time    Hemoglobin A1c 8.2 (H) 11/26/2018 02:55 PM    Hemoglobin A1c, External 7.1 12/08/2017     NPH 30 units in AM and 20 units   No data to adjust insulin    5 Anemia - has HIV , follow up with PCP    Follow-up Disposition:  Return in about 3 months (around 2/26/2019). Thank you for allowing me to participate in the care of this patient.     Joseph Chadwick MD      Patient verbalized understanding

## 2018-11-26 NOTE — PATIENT INSTRUCTIONS
Radioactive Iodine: What to Expect at Home  Your Recovery  Radioactive iodine is absorbed and concentrated by the thyroid gland. You get it in liquid or pill form. The radiation will pass out of your body through your urine within days. Until that time, you will give off radiation in your sweat, your saliva, your urine, and anything else that comes out of your body. It is important to avoid exposing other people to the radioactivity from your body. Your doctor will give you more written instructions. Follow these carefully. The instructions will tell you how far to stay away from people and how long you need to follow the precautions listed below. They will list other ways to keep other people safe. They will also tell you when it will be safe to go out, go to work, and do other activities. How can you care for yourself at home? General recommendations  · For a period of time, you will need to keep your distance from other people, especially young children and pregnant women. · Avoid close contact, kissing, and sexual activity. You may need to sleep in a separate bed from your partner. · Keep the toilet very clean. Men should urinate sitting down to avoid splashing. Flush the toilet 2 or 3 times after each use. Wash your hands well with soap and lots of water each time you use the toilet. · Rinse the bathroom sink and tub well after you use them. · Use separate towels, washcloths, and sheets. Wash these and your personal clothing by themselves. Don't wash them with other people's laundry. · You may want to use a special plastic trash bag for all your trash, such as bandages, paper or plastic dishes, menstrual pads, tissues, or paper towels. Talk to your treatment facility to see if they will handle the disposal. Or after 80 days, this bag can be thrown out with your other trash. · Wash your dishes in a  or by hand.  If you use disposable dishes, they must be thrown away in the special plastic trash bag. · Don't cook for other people. If you must cook, use plastic gloves. Then throw them away in the special plastic trash bag. Don't share cups, dishes, or utensils. Pregnancy and children  · Your doctor will tell you when it is safe to have sex and become pregnant. · You should not breastfeed your baby after you have been treated with radioactive iodine. Ask your doctor when it's safe to breastfeed. Travel  · Don't take public transportation. If you are able, it's best to drive yourself. · It is important to prepare for any problems you may have at airport security. People who have had radioactive iodine treatment can set off the radiation detection machines in airports for a week to 10 days. Check with local authorities about any steps or permission you may need to travel. · If you plan to travel on the interstate, you may set off radiation detectors. Most police and transportation workers are aware of medical radiation, but it may help to carry some paperwork from your doctor. Follow-up care is a key part of your treatment and safety. Be sure to make and go to all appointments, and call your doctor if you are having problems. It's also a good idea to know your test results and keep a list of the medicines you take. When should you call for help? Watch closely for any changes in your health, and be sure to contact your doctor if:    · You have a sore throat.     · You vomit.     · You have diarrhea. Where can you learn more? Go to http://bridget-agustín.info/. Enter B699 in the search box to learn more about \"Radioactive Iodine: What to Expect at Home. \"  Current as of: March 15, 2018  Content Version: 11.8  © 5278-4187 [employer]. Care instructions adapted under license by [employer] (which disclaims liability or warranty for this information).  If you have questions about a medical condition or this instruction, always ask your healthcare professional. [employer] [facility] disclaims any warranty or liability for your use of this information. After radioactive iodine treatment there is a possibility the thyroid can slow down and you will need life-long thyroid hormone replacement. This may take upto 6 months and will need thyroid function monitored. There is a rare chance that you may  need a second dose of ALATORRE. I will have to stop the methimazole 1 week prior to therapy.

## 2018-11-26 NOTE — PROGRESS NOTES
Radha Crane is a 59 y.o. female here for   Chief Complaint   Patient presents with    Diabetes    Thyroid Problem       Functional glucose monitor and record keeping system? - yes  Eye exam within last year? - no  Foot exam within last year? - on file    1. Have you been to the ER, urgent care clinic since your last visit? Hospitalized since your last visit? -no    2. Have you seen or consulted any other health care providers outside of the [address] [place] [address] since your last visit? Include any pap smears or colon screening. -PCP

## 2018-11-27 PROBLEM — E11.21 TYPE 2 DIABETES WITH NEPHROPATHY (HCC): Status: ACTIVE | Noted: 2018-11-27

## 2018-11-27 LAB
ALBUMIN SERPL-MCNC: 4.2 G/DL (ref 3.6–4.8)
ALBUMIN/CREAT UR: 3298.8 MG/G CREAT (ref 0–30)
ALBUMIN/GLOB SERPL: 1.4 {RATIO} (ref 1.2–2.2)
ALP SERPL-CCNC: 178 IU/L (ref 39–117)
ALT SERPL-CCNC: 15 IU/L (ref 0–32)
AST SERPL-CCNC: 13 IU/L (ref 0–40)
BASOPHILS # BLD AUTO: 0 X10E3/UL (ref 0–0.2)
BASOPHILS NFR BLD AUTO: 1 %
BILIRUB SERPL-MCNC: 4 MG/DL (ref 0–1.2)
BUN SERPL-MCNC: 38 MG/DL (ref 8–27)
BUN/CREAT SERPL: 19 (ref 12–28)
CALCIUM SERPL-MCNC: 9.5 MG/DL (ref 8.7–10.3)
CHLORIDE SERPL-SCNC: 103 MMOL/L (ref 96–106)
CO2 SERPL-SCNC: 24 MMOL/L (ref 20–29)
CREAT SERPL-MCNC: 1.99 MG/DL (ref 0.57–1)
CREAT UR-MCNC: 34.5 MG/DL
EOSINOPHIL # BLD AUTO: 0.2 X10E3/UL (ref 0–0.4)
EOSINOPHIL NFR BLD AUTO: 2 %
ERYTHROCYTE [DISTWIDTH] IN BLOOD BY AUTOMATED COUNT: 14.3 % (ref 12.3–15.4)
EST. AVERAGE GLUCOSE BLD GHB EST-MCNC: 189 MG/DL
GLOBULIN SER CALC-MCNC: 3.1 G/DL (ref 1.5–4.5)
GLUCOSE SERPL-MCNC: 110 MG/DL (ref 65–99)
HBA1C MFR BLD: 8.2 % (ref 4.8–5.6)
HCT VFR BLD AUTO: 30.2 % (ref 34–46.6)
HGB BLD-MCNC: 9.5 G/DL (ref 11.1–15.9)
IMM GRANULOCYTES # BLD: 0 X10E3/UL (ref 0–0.1)
IMM GRANULOCYTES NFR BLD: 0 %
INTERPRETATION: NORMAL
LDLC SERPL DIRECT ASSAY-MCNC: 89 MG/DL (ref 0–99)
LYMPHOCYTES # BLD AUTO: 1.9 X10E3/UL (ref 0.7–3.1)
LYMPHOCYTES NFR BLD AUTO: 30 %
Lab: NORMAL
MCH RBC QN AUTO: 28.2 PG (ref 26.6–33)
MCHC RBC AUTO-ENTMCNC: 31.5 G/DL (ref 31.5–35.7)
MCV RBC AUTO: 90 FL (ref 79–97)
MICROALBUMIN UR-MCNC: 1138.1 UG/ML
MONOCYTES # BLD AUTO: 0.6 X10E3/UL (ref 0.1–0.9)
MONOCYTES NFR BLD AUTO: 9 %
NEUTROPHILS # BLD AUTO: 3.7 X10E3/UL (ref 1.4–7)
NEUTROPHILS NFR BLD AUTO: 58 %
PLATELET # BLD AUTO: 325 X10E3/UL (ref 150–379)
POTASSIUM SERPL-SCNC: 4.8 MMOL/L (ref 3.5–5.2)
PROT SERPL-MCNC: 7.3 G/DL (ref 6–8.5)
RBC # BLD AUTO: 3.37 X10E6/UL (ref 3.77–5.28)
SODIUM SERPL-SCNC: 144 MMOL/L (ref 134–144)
T4 FREE SERPL-MCNC: 2.12 NG/DL (ref 0.82–1.77)
TSH SERPL DL<=0.005 MIU/L-ACNC: <0.006 UIU/ML (ref 0.45–4.5)
WBC # BLD AUTO: 6.4 X10E3/UL (ref 3.4–10.8)

## 2018-11-27 NOTE — PROGRESS NOTES
Thyroid test is still fast, as discussed during the visit we should proceed with radioactive iodine treatment.   She is to stop methimazole 5 days before the iodine treatment, and will order the test to be done if she is okay

## 2018-11-28 ENCOUNTER — TELEPHONE (OUTPATIENT)
Dept: ENDOCRINOLOGY | Age: 63
End: 2018-11-28

## 2018-11-28 NOTE — TELEPHONE ENCOUNTER
----- Message from Radha Otero MD sent at 11/27/2018  5:43 PM EST -----  Thyroid test is still fast, as discussed during the visit we should proceed with radioactive iodine treatment.   She is to stop methimazole 5 days before the iodine treatment, and will order the test to be done if she is okay

## 2018-11-28 NOTE — TELEPHONE ENCOUNTER
Attempted to call x2. First time someone picked up but did not say anything and hung up. Second time call was dismissed.

## 2018-11-29 NOTE — TELEPHONE ENCOUNTER
Attempted to call. Unsuccessful. Left msg for Linda Irvin to give us a call back at the office. A callback number was left.

## 2018-11-29 NOTE — TELEPHONE ENCOUNTER
Attempted to call. Unsuccessful. Left msg for Susan Anderson to give us a call back at the office. A callback number was left.

## 2018-11-30 NOTE — TELEPHONE ENCOUNTER
Per Dr. Jeanna Hernandez, informed pt of result note, as noted above. Pt verbalized understanding and stated she is not ready and wants to wait until the new year and if it snows she is not coming out. Asked pt to give us a call back when she is ready so we can place the order then. No further questions or concerns at this time.

## 2018-12-04 DIAGNOSIS — E05.90 HYPERTHYROIDISM: ICD-10-CM

## 2018-12-04 DIAGNOSIS — Z79.4 UNCONTROLLED TYPE 2 DIABETES MELLITUS WITH HYPERGLYCEMIA, WITH LONG-TERM CURRENT USE OF INSULIN (HCC): ICD-10-CM

## 2018-12-04 DIAGNOSIS — E04.0 GOITER DIFFUSE: ICD-10-CM

## 2018-12-04 DIAGNOSIS — E11.65 UNCONTROLLED TYPE 2 DIABETES MELLITUS WITH HYPERGLYCEMIA, WITH LONG-TERM CURRENT USE OF INSULIN (HCC): ICD-10-CM

## 2018-12-04 RX ORDER — METHIMAZOLE 10 MG/1
TABLET ORAL
Qty: 30 TAB | Refills: 4 | Status: SHIPPED | OUTPATIENT
Start: 2018-12-04

## 2018-12-04 RX ORDER — METHIMAZOLE 10 MG/1
TABLET ORAL
Qty: 30 TAB | Refills: 0 | Status: SHIPPED | OUTPATIENT
Start: 2018-12-04 | End: 2018-12-04 | Stop reason: SDUPTHER

## 2019-01-01 ENCOUNTER — TELEPHONE (OUTPATIENT)
Dept: ENDOCRINOLOGY | Age: 64
End: 2019-01-01

## 2019-01-01 DIAGNOSIS — E05.90 HYPERTHYROIDISM: ICD-10-CM

## 2019-01-01 DIAGNOSIS — Z79.4 TYPE 2 DIABETES MELLITUS WITH DIABETIC NEUROPATHY, WITH LONG-TERM CURRENT USE OF INSULIN (HCC): Primary | ICD-10-CM

## 2019-01-01 DIAGNOSIS — Z79.4 UNCONTROLLED TYPE 2 DIABETES MELLITUS WITH HYPERGLYCEMIA, WITH LONG-TERM CURRENT USE OF INSULIN (HCC): ICD-10-CM

## 2019-01-01 DIAGNOSIS — E04.0 GOITER DIFFUSE: ICD-10-CM

## 2019-01-01 DIAGNOSIS — E11.65 TYPE 2 DIABETES MELLITUS WITH HYPERGLYCEMIA, WITH LONG-TERM CURRENT USE OF INSULIN (HCC): ICD-10-CM

## 2019-01-01 DIAGNOSIS — E11.65 UNCONTROLLED TYPE 2 DIABETES MELLITUS WITH HYPERGLYCEMIA, WITH LONG-TERM CURRENT USE OF INSULIN (HCC): ICD-10-CM

## 2019-01-01 DIAGNOSIS — Z79.4 TYPE 2 DIABETES MELLITUS WITH HYPERGLYCEMIA, WITH LONG-TERM CURRENT USE OF INSULIN (HCC): Primary | ICD-10-CM

## 2019-01-01 DIAGNOSIS — E11.65 TYPE 2 DIABETES MELLITUS WITH HYPERGLYCEMIA, WITH LONG-TERM CURRENT USE OF INSULIN (HCC): Primary | ICD-10-CM

## 2019-01-01 DIAGNOSIS — E11.40 TYPE 2 DIABETES MELLITUS WITH DIABETIC NEUROPATHY, WITH LONG-TERM CURRENT USE OF INSULIN (HCC): Primary | ICD-10-CM

## 2019-01-01 DIAGNOSIS — Z79.4 TYPE 2 DIABETES MELLITUS WITH HYPERGLYCEMIA, WITH LONG-TERM CURRENT USE OF INSULIN (HCC): ICD-10-CM

## 2019-01-01 LAB
ALBUMIN SERPL-MCNC: 4.2 G/DL (ref 3.6–4.8)
ALBUMIN/CREAT UR: 391.2 MG/G CREAT (ref 0–30)
ALBUMIN/GLOB SERPL: 1.4 {RATIO} (ref 1.2–2.2)
ALP SERPL-CCNC: 160 IU/L (ref 39–117)
ALT SERPL-CCNC: 18 IU/L (ref 0–32)
AST SERPL-CCNC: 21 IU/L (ref 0–40)
BILIRUB SERPL-MCNC: 2.5 MG/DL (ref 0–1.2)
BUN SERPL-MCNC: 96 MG/DL (ref 8–27)
BUN/CREAT SERPL: 26 (ref 12–28)
CALCIUM SERPL-MCNC: 9.1 MG/DL (ref 8.7–10.3)
CHLORIDE SERPL-SCNC: 104 MMOL/L (ref 96–106)
CHOLEST SERPL-MCNC: 159 MG/DL (ref 100–199)
CO2 SERPL-SCNC: 17 MMOL/L (ref 20–29)
CREAT SERPL-MCNC: 3.65 MG/DL (ref 0.57–1)
CREAT UR-MCNC: 70.3 MG/DL
EST. AVERAGE GLUCOSE BLD GHB EST-MCNC: 206 MG/DL
GLOBULIN SER CALC-MCNC: 3.1 G/DL (ref 1.5–4.5)
GLUCOSE SERPL-MCNC: 79 MG/DL (ref 65–99)
HBA1C MFR BLD: 8.8 % (ref 4.8–5.6)
HDLC SERPL-MCNC: 43 MG/DL
INTERPRETATION, 910389: NORMAL
INTERPRETATION: NORMAL
LDLC SERPL CALC-MCNC: 95 MG/DL (ref 0–99)
Lab: NORMAL
MICROALBUMIN UR-MCNC: 275 UG/ML
PDF IMAGE, 910387: NORMAL
POTASSIUM SERPL-SCNC: 4.9 MMOL/L (ref 3.5–5.2)
PROT SERPL-MCNC: 7.3 G/DL (ref 6–8.5)
SODIUM SERPL-SCNC: 143 MMOL/L (ref 134–144)
T4 FREE SERPL-MCNC: 1.44 NG/DL (ref 0.82–1.77)
TRIGL SERPL-MCNC: 104 MG/DL (ref 0–149)
TSH SERPL DL<=0.005 MIU/L-ACNC: 0.1 UIU/ML (ref 0.45–4.5)
VLDLC SERPL CALC-MCNC: 21 MG/DL (ref 5–40)

## 2019-01-01 RX ORDER — INSULIN LISPRO 100 [IU]/ML
INJECTION, SOLUTION INTRAVENOUS; SUBCUTANEOUS
Qty: 20 ML | Refills: 5 | Status: SHIPPED | OUTPATIENT
Start: 2019-01-01

## 2019-01-01 RX ORDER — INSULIN LISPRO 100 [IU]/ML
INJECTION, SOLUTION INTRAVENOUS; SUBCUTANEOUS
Qty: 20 ML | Refills: 5
Start: 2019-01-01 | End: 2019-01-01 | Stop reason: SDUPTHER

## 2019-11-02 NOTE — TELEPHONE ENCOUNTER
----- Message from Henry Cortes MD sent at 4/8/2017 10:58 PM EDT -----  Please see if CBC can be added
CBC added
no abdominal pain/no vomiting

## 2020-08-13 NOTE — TELEPHONE ENCOUNTER
Attempted to call. Unsuccessful. Left ms for Anson Shelton to give us a call back at the office. A callback number was left. Kerri Still,    This patient is requesting for this Prenatal Vitamin but we have not filled this any time recently. We are also seeing a note here in Epic that its been reported that this is no longer being used by patient since 03/15/2019? Please let us know if there are any questions and reach out to patient to discuss if necessary.    Thanks,    Zak Smith  Pharm Tech  [facility]   [phone]

## 2024-09-11 NOTE — TELEPHONE ENCOUNTER
If sugars are high need follow up soon in a week     We will send the insulin , ask her to check sugars before each meal and maintain a log     Pt has cancelled several appointments , regular follow up is important for better sugars control
Patient needs refill on Humulin N -100  She is out and blood sugar is high. She needs to speak with nurse about high blood sughar as soon as possible.
Pt stated her BG has been good 101-115. It was high this morning 144 because she ran out of insulin but other than that she is doing good. Pt stated even her weight is down to 177 lbs. Informed her insulin was sent and should be ready for  soon and if everything is good we'll can keep her f/u the same and to make sure she brings her log to her visit. Pt verbalized understanding with no further questions or concerns at this time.
98.3